# Patient Record
Sex: MALE | Race: WHITE | NOT HISPANIC OR LATINO | URBAN - METROPOLITAN AREA
[De-identification: names, ages, dates, MRNs, and addresses within clinical notes are randomized per-mention and may not be internally consistent; named-entity substitution may affect disease eponyms.]

---

## 2019-06-30 ENCOUNTER — EMERGENCY (EMERGENCY)
Facility: HOSPITAL | Age: 69
LOS: 0 days | Discharge: HOME | End: 2019-06-30
Attending: EMERGENCY MEDICINE | Admitting: EMERGENCY MEDICINE
Payer: MEDICARE

## 2019-06-30 VITALS
RESPIRATION RATE: 18 BRPM | DIASTOLIC BLOOD PRESSURE: 93 MMHG | OXYGEN SATURATION: 95 % | SYSTOLIC BLOOD PRESSURE: 158 MMHG | TEMPERATURE: 100 F | HEART RATE: 82 BPM

## 2019-06-30 VITALS
WEIGHT: 192.02 LBS | HEIGHT: 66 IN | HEART RATE: 89 BPM | SYSTOLIC BLOOD PRESSURE: 155 MMHG | TEMPERATURE: 103 F | RESPIRATION RATE: 18 BRPM | DIASTOLIC BLOOD PRESSURE: 67 MMHG | OXYGEN SATURATION: 95 %

## 2019-06-30 DIAGNOSIS — Z90.49 ACQUIRED ABSENCE OF OTHER SPECIFIED PARTS OF DIGESTIVE TRACT: Chronic | ICD-10-CM

## 2019-06-30 DIAGNOSIS — R50.9 FEVER, UNSPECIFIED: ICD-10-CM

## 2019-06-30 DIAGNOSIS — Z87.891 PERSONAL HISTORY OF NICOTINE DEPENDENCE: ICD-10-CM

## 2019-06-30 DIAGNOSIS — J02.9 ACUTE PHARYNGITIS, UNSPECIFIED: ICD-10-CM

## 2019-06-30 DIAGNOSIS — Z95.1 PRESENCE OF AORTOCORONARY BYPASS GRAFT: Chronic | ICD-10-CM

## 2019-06-30 LAB
ALBUMIN SERPL ELPH-MCNC: 5.1 G/DL — SIGNIFICANT CHANGE UP (ref 3.5–5.2)
ALP SERPL-CCNC: 104 U/L — SIGNIFICANT CHANGE UP (ref 30–115)
ALT FLD-CCNC: 45 U/L — HIGH (ref 0–41)
ANION GAP SERPL CALC-SCNC: 17 MMOL/L — HIGH (ref 7–14)
APPEARANCE UR: CLEAR — SIGNIFICANT CHANGE UP
AST SERPL-CCNC: 53 U/L — HIGH (ref 0–41)
BACTERIA # UR AUTO: ABNORMAL
BASOPHILS # BLD AUTO: 0.02 K/UL — SIGNIFICANT CHANGE UP (ref 0–0.2)
BASOPHILS NFR BLD AUTO: 0.3 % — SIGNIFICANT CHANGE UP (ref 0–1)
BILIRUB SERPL-MCNC: 0.9 MG/DL — SIGNIFICANT CHANGE UP (ref 0.2–1.2)
BILIRUB UR-MCNC: NEGATIVE — SIGNIFICANT CHANGE UP
BUN SERPL-MCNC: 22 MG/DL — HIGH (ref 10–20)
CALCIUM SERPL-MCNC: 9.5 MG/DL — SIGNIFICANT CHANGE UP (ref 8.5–10.1)
CHLORIDE SERPL-SCNC: 98 MMOL/L — SIGNIFICANT CHANGE UP (ref 98–110)
CO2 SERPL-SCNC: 25 MMOL/L — SIGNIFICANT CHANGE UP (ref 17–32)
COLOR SPEC: YELLOW — SIGNIFICANT CHANGE UP
CREAT SERPL-MCNC: 2.1 MG/DL — HIGH (ref 0.7–1.5)
DIFF PNL FLD: ABNORMAL
EOSINOPHIL # BLD AUTO: 0.01 K/UL — SIGNIFICANT CHANGE UP (ref 0–0.7)
EOSINOPHIL NFR BLD AUTO: 0.1 % — SIGNIFICANT CHANGE UP (ref 0–8)
EPI CELLS # UR: ABNORMAL /HPF
GLUCOSE SERPL-MCNC: 143 MG/DL — HIGH (ref 70–99)
GLUCOSE UR QL: NEGATIVE MG/DL — SIGNIFICANT CHANGE UP
GRAN CASTS # UR COMP ASSIST: ABNORMAL /LPF
HCT VFR BLD CALC: 38 % — LOW (ref 42–52)
HGB BLD-MCNC: 12.9 G/DL — LOW (ref 14–18)
HYALINE CASTS # UR AUTO: ABNORMAL /LPF
IMM GRANULOCYTES NFR BLD AUTO: 0.1 % — SIGNIFICANT CHANGE UP (ref 0.1–0.3)
KETONES UR-MCNC: ABNORMAL
LACTATE SERPL-SCNC: 1.4 MMOL/L — SIGNIFICANT CHANGE UP (ref 0.5–2.2)
LEUKOCYTE ESTERASE UR-ACNC: NEGATIVE — SIGNIFICANT CHANGE UP
LYMPHOCYTES # BLD AUTO: 0.65 K/UL — LOW (ref 1.2–3.4)
LYMPHOCYTES # BLD AUTO: 9.2 % — LOW (ref 20.5–51.1)
MCHC RBC-ENTMCNC: 30.1 PG — SIGNIFICANT CHANGE UP (ref 27–31)
MCHC RBC-ENTMCNC: 33.9 G/DL — SIGNIFICANT CHANGE UP (ref 32–37)
MCV RBC AUTO: 88.6 FL — SIGNIFICANT CHANGE UP (ref 80–94)
MONOCYTES # BLD AUTO: 0.88 K/UL — HIGH (ref 0.1–0.6)
MONOCYTES NFR BLD AUTO: 12.4 % — HIGH (ref 1.7–9.3)
NEUTROPHILS # BLD AUTO: 5.52 K/UL — SIGNIFICANT CHANGE UP (ref 1.4–6.5)
NEUTROPHILS NFR BLD AUTO: 77.9 % — HIGH (ref 42.2–75.2)
NITRITE UR-MCNC: NEGATIVE — SIGNIFICANT CHANGE UP
NRBC # BLD: 0 /100 WBCS — SIGNIFICANT CHANGE UP (ref 0–0)
PH UR: 6 — SIGNIFICANT CHANGE UP (ref 5–8)
PLATELET # BLD AUTO: 200 K/UL — SIGNIFICANT CHANGE UP (ref 130–400)
POTASSIUM SERPL-MCNC: 4 MMOL/L — SIGNIFICANT CHANGE UP (ref 3.5–5)
POTASSIUM SERPL-SCNC: 4 MMOL/L — SIGNIFICANT CHANGE UP (ref 3.5–5)
PROT SERPL-MCNC: 8 G/DL — SIGNIFICANT CHANGE UP (ref 6–8)
PROT UR-MCNC: >=300 MG/DL
RBC # BLD: 4.29 M/UL — LOW (ref 4.7–6.1)
RBC # FLD: 12.7 % — SIGNIFICANT CHANGE UP (ref 11.5–14.5)
RBC CASTS # UR COMP ASSIST: ABNORMAL /HPF
SODIUM SERPL-SCNC: 140 MMOL/L — SIGNIFICANT CHANGE UP (ref 135–146)
SP GR SPEC: >=1.03 (ref 1.01–1.03)
UROBILINOGEN FLD QL: 0.2 MG/DL — SIGNIFICANT CHANGE UP (ref 0.2–0.2)
WBC # BLD: 7.09 K/UL — SIGNIFICANT CHANGE UP (ref 4.8–10.8)
WBC # FLD AUTO: 7.09 K/UL — SIGNIFICANT CHANGE UP (ref 4.8–10.8)
WBC UR QL: SIGNIFICANT CHANGE UP /HPF

## 2019-06-30 PROCEDURE — 71046 X-RAY EXAM CHEST 2 VIEWS: CPT | Mod: 26

## 2019-06-30 PROCEDURE — 99284 EMERGENCY DEPT VISIT MOD MDM: CPT

## 2019-06-30 RX ORDER — SODIUM CHLORIDE 9 MG/ML
1000 INJECTION, SOLUTION INTRAVENOUS ONCE
Refills: 0 | Status: COMPLETED | OUTPATIENT
Start: 2019-06-30 | End: 2019-06-30

## 2019-06-30 RX ORDER — AMOXICILLIN 250 MG/5ML
1 SUSPENSION, RECONSTITUTED, ORAL (ML) ORAL
Qty: 21 | Refills: 0
Start: 2019-06-30 | End: 2019-07-06

## 2019-06-30 RX ORDER — ACETAMINOPHEN 500 MG
650 TABLET ORAL ONCE
Refills: 0 | Status: COMPLETED | OUTPATIENT
Start: 2019-06-30 | End: 2019-06-30

## 2019-06-30 RX ADMIN — Medication 650 MILLIGRAM(S): at 15:16

## 2019-06-30 RX ADMIN — SODIUM CHLORIDE 1000 MILLILITER(S): 9 INJECTION, SOLUTION INTRAVENOUS at 15:31

## 2019-06-30 NOTE — ED PROVIDER NOTE - CLINICAL SUMMARY MEDICAL DECISION MAKING FREE TEXT BOX
Results reviewed and d/w patient.  No prior labs available for comparison.  Pt thinks that his previous Cr was 1.6.  Pt instructed that he will need to follow up with PMD and well as renal.  Will start abx at this time, rec cont supportive care.  Pt will return to ED if any worseing symptoms or concerns.  He verbalizes understanding

## 2019-06-30 NOTE — ED PROVIDER NOTE - CARE PROVIDER_API CALL
Aakash Parry)  Internal Medicine; Nephrology  [facility] [place] [address]  Phone: [phone]  Fax: [phone]  Follow Up Time:     YOUR PMD,   Phone: (   )    -  Fax: (   )    -  Follow Up Time:     Nasir Bradshaw)  Internal Medicine; Nephrology  [address] [address]  Phone: [phone]  Fax: [phone]  Follow Up Time:

## 2019-06-30 NOTE — ED PROVIDER NOTE - PROGRESS NOTE DETAILS
discussed lab results with patient. Patient understands importance of follow up with nephrology and his PMD as outpatient. Return precautions given. Patient agreeable to discharge.

## 2019-06-30 NOTE — ED PROVIDER NOTE - NS ED ROS FT
Constitutional: (+) fever (+) malaise (-) diaphoresis (-) chills  Eyes: (-) visual changes   ENMT: (+) sore throat (+) hoarseness (-) nasal or chest congestion (-) runny nose (-) ear pain  (-) neck pain (-) neck stiffness  Cardiac: (-) chest pain  Respiratory: (-) cough (-) hemoptysis (-) SOB   GI: (-) nausea (-) vomiting (-) diarrhea   : (-) dysuria (-) increased frequency  (-) hematuria (-) incontinence  MS: (-) back pain   Neuro: (-) headache (-) dizziness (-) numbness/tingling to extremities B/L (-) weakness   Skin: (-) rash   Except as documented in the HPI, all other systems are negative.

## 2019-06-30 NOTE — ED PROVIDER NOTE - PHYSICAL EXAMINATION
GENERAL: Well-nourished, Well-developed. NAD.  Eyes: PERRLA, EOMI. Pink conjunctiva B/L. No asymmetry. No nystagmus. No conjunctival injection. Non-icteric sclera.  ENMT: MMM. Mild pharyngeal erythema with no exudates. Uvula midline. No oral lesions. TMs clear with good cone of light B/L. Nares patent. No tongue fasciculations.  Neck: Supple. No LAD. No cervical midline TTP. FROM  CVS: Normal S1,S2. No murmurs appreciated on auscultation   RESP: No use of accessory muscles. Chest rise symmetrical with good expansion. Lungs clear to auscultation B/L. No wheezing, rales, or rhonchi auscultated.  GI: Normal auscultation of bowel sounds in all 4 quadrants. Soft, Nontender, Nondistended.   MSK: No visible signs of trauma such as ecchymosis, erythema, or swelling. FROM of upper and lower extremities B/L.   Skin: Warm, Dry. No rashes or lesions.   EXT: Radial pulses present B/L.   Neuro: AA&O x 3. CNs II-XII grossly intact. Speaking in full sentences. No slurring of speech. No facial droop. No tremors. Sensation grossly intact. Strength 5/5 B/L. Gait within normal limits.   Psych:  Cooperative. Calm

## 2019-06-30 NOTE — ED PROVIDER NOTE - ATTENDING CONTRIBUTION TO CARE
67 yo M PMHx liver and colon cancer with h/o liver resection presents with c/o sore throat and fever x 4 days.  Pt states temp has been low grade.  + cough. no CP, no SOB.  no n/v, no rash.  Pt with  recent sick contacts.  Took 2 Tylenol this am.  On exam pt in NAD AAo x 3, non toxic, OP with erythema, + vesicle left palate, no exudates, uvula without swelling and is midline, no lad, TM with cerumen bilateral, Lungs CTA B/L, abd is soft nt nd, surgical scars noted, no rash,. no edema, good ROM, no calf tenderness 69 yo M PMHx liver and colon cancer with h/o liver resection presents with c/o sore throat and fever x 4 days.  Pt states temp has been low grade.  + cough. no CP, no SOB.  no n/v, no rash.  Pt with  recent sick contacts.  Took 2 Tylenol this am.  On exam pt in NAD AAo x 3, non toxic, OP with erythema, + vesicle left palate, no exudates, uvula without swelling and is midline, no lad, TM with cerumen bilateral, Lungs CTA B/L, + murmur, abd is soft nt nd, surgical scars noted, no rash,. no edema, good ROM, no calf tenderness

## 2019-06-30 NOTE — ED PROVIDER NOTE - NSFOLLOWUPINSTRUCTIONS_ED_ALL_ED_FT
Take medication as directed.  Follow up with your PMD and nephrologist as outpatient.    Pharyngitis    Pharyngitis is inflammation of your pharynx, which is typically caused by a viral or bacterial infection. Pharyngitis can be contagious and may spread from person to person through intimate contact, coughing, sneezing, or sharing personal items and utensils. Symptoms of pharyngitis may include sore throat, fever, headache, or swollen lymph nodes. If you are prescribed antibiotics, make sure you finish them even if you start to feel better. Gargle with salt water as often as every 1-2 hours to soothe your throat. Throat lozenges (if you are not at risk for choking) or sprays may be used to soothe your throat.    SEEK IMMEDIATE MEDICAL CARE IF YOU HAVE ANY OF THE FOLLOWING SYMPTOMS: neck stiffness, drooling, hoarseness or change in voice, inability to swallow liquids, vomiting, or trouble breathing.

## 2019-06-30 NOTE — ED PROVIDER NOTE - OBJECTIVE STATEMENT
69 yo male with Liver cancer (in remission March 2019 s/p chemo), CABG X3, Rectal bleeding (required transfusion) presents to the ED c/o sore throat and fever of 101.0 F at home x 4 days. Patient toook two Tylenol this morning.  Patient admits to recent sick contacts. 69 yo male with Liver cancer (in remission March 2019 s/p chemo and resection), CABG X3, Rectal bleeding (required transfusion) presents to the ED c/o sore throat and fever of 101.0 F at home x 4 days. Patient toook two Tylenol this morning.  Patient admits to recent sick contacts. Patient denies chest pain, SOB, cough, rash, SOB, ear pain, N/V/D, or neck pain.

## 2019-06-30 NOTE — ED PROVIDER NOTE - PROVIDER TOKENS
PROVIDER:[TOKEN:[[id number]]],FREE:[LAST:[YOUR PMD],PHONE:[(   )    -],FAX:[(   )    -]],PROVIDER:[TOKEN:[[id number]]]

## 2019-07-01 LAB
CULTURE RESULTS: SIGNIFICANT CHANGE UP
SPECIMEN SOURCE: SIGNIFICANT CHANGE UP

## 2021-09-14 ENCOUNTER — INPATIENT (INPATIENT)
Facility: HOSPITAL | Age: 71
LOS: 2 days | Discharge: HOME | End: 2021-09-17
Payer: MEDICARE

## 2021-09-14 VITALS
RESPIRATION RATE: 18 BRPM | SYSTOLIC BLOOD PRESSURE: 181 MMHG | TEMPERATURE: 101 F | HEART RATE: 93 BPM | WEIGHT: 190.04 LBS | DIASTOLIC BLOOD PRESSURE: 100 MMHG | HEIGHT: 66 IN | OXYGEN SATURATION: 96 %

## 2021-09-14 DIAGNOSIS — Z90.49 ACQUIRED ABSENCE OF OTHER SPECIFIED PARTS OF DIGESTIVE TRACT: Chronic | ICD-10-CM

## 2021-09-14 DIAGNOSIS — Z95.1 PRESENCE OF AORTOCORONARY BYPASS GRAFT: Chronic | ICD-10-CM

## 2021-09-14 DIAGNOSIS — J18.9 PNEUMONIA, UNSPECIFIED ORGANISM: ICD-10-CM

## 2021-09-14 LAB
ALBUMIN SERPL ELPH-MCNC: 4.2 G/DL — SIGNIFICANT CHANGE UP (ref 3.5–5.2)
ALP SERPL-CCNC: 119 U/L — HIGH (ref 30–115)
ALT FLD-CCNC: 22 U/L — SIGNIFICANT CHANGE UP (ref 0–41)
ANION GAP SERPL CALC-SCNC: 17 MMOL/L — HIGH (ref 7–14)
AST SERPL-CCNC: 19 U/L — SIGNIFICANT CHANGE UP (ref 0–41)
BASOPHILS # BLD AUTO: 0.02 K/UL — SIGNIFICANT CHANGE UP (ref 0–0.2)
BASOPHILS NFR BLD AUTO: 0.2 % — SIGNIFICANT CHANGE UP (ref 0–1)
BILIRUB SERPL-MCNC: 1.3 MG/DL — HIGH (ref 0.2–1.2)
BUN SERPL-MCNC: 26 MG/DL — HIGH (ref 10–20)
CALCIUM SERPL-MCNC: 8.9 MG/DL — SIGNIFICANT CHANGE UP (ref 8.5–10.1)
CHLORIDE SERPL-SCNC: 103 MMOL/L — SIGNIFICANT CHANGE UP (ref 98–110)
CO2 SERPL-SCNC: 19 MMOL/L — SIGNIFICANT CHANGE UP (ref 17–32)
CREAT SERPL-MCNC: 2.6 MG/DL — HIGH (ref 0.7–1.5)
EOSINOPHIL # BLD AUTO: 0.02 K/UL — SIGNIFICANT CHANGE UP (ref 0–0.7)
EOSINOPHIL NFR BLD AUTO: 0.2 % — SIGNIFICANT CHANGE UP (ref 0–8)
GLUCOSE BLDC GLUCOMTR-MCNC: 70 MG/DL — SIGNIFICANT CHANGE UP (ref 70–99)
GLUCOSE SERPL-MCNC: 152 MG/DL — HIGH (ref 70–99)
HCT VFR BLD CALC: 31.3 % — LOW (ref 42–52)
HGB BLD-MCNC: 10.5 G/DL — LOW (ref 14–18)
IMM GRANULOCYTES NFR BLD AUTO: 0.5 % — HIGH (ref 0.1–0.3)
LYMPHOCYTES # BLD AUTO: 0.53 K/UL — LOW (ref 1.2–3.4)
LYMPHOCYTES # BLD AUTO: 4 % — LOW (ref 20.5–51.1)
MAGNESIUM SERPL-MCNC: 1.7 MG/DL — LOW (ref 1.8–2.4)
MCHC RBC-ENTMCNC: 29.5 PG — SIGNIFICANT CHANGE UP (ref 27–31)
MCHC RBC-ENTMCNC: 33.5 G/DL — SIGNIFICANT CHANGE UP (ref 32–37)
MCV RBC AUTO: 87.9 FL — SIGNIFICANT CHANGE UP (ref 80–94)
MONOCYTES # BLD AUTO: 1.05 K/UL — HIGH (ref 0.1–0.6)
MONOCYTES NFR BLD AUTO: 8 % — SIGNIFICANT CHANGE UP (ref 1.7–9.3)
NEUTROPHILS # BLD AUTO: 11.43 K/UL — HIGH (ref 1.4–6.5)
NEUTROPHILS NFR BLD AUTO: 87.1 % — HIGH (ref 42.2–75.2)
NRBC # BLD: 0 /100 WBCS — SIGNIFICANT CHANGE UP (ref 0–0)
NT-PROBNP SERPL-SCNC: HIGH PG/ML (ref 0–300)
PLATELET # BLD AUTO: 214 K/UL — SIGNIFICANT CHANGE UP (ref 130–400)
POTASSIUM SERPL-MCNC: 3.8 MMOL/L — SIGNIFICANT CHANGE UP (ref 3.5–5)
POTASSIUM SERPL-SCNC: 3.8 MMOL/L — SIGNIFICANT CHANGE UP (ref 3.5–5)
PROT SERPL-MCNC: 6.6 G/DL — SIGNIFICANT CHANGE UP (ref 6–8)
RBC # BLD: 3.56 M/UL — LOW (ref 4.7–6.1)
RBC # FLD: 13.2 % — SIGNIFICANT CHANGE UP (ref 11.5–14.5)
SARS-COV-2 RNA SPEC QL NAA+PROBE: SIGNIFICANT CHANGE UP
SODIUM SERPL-SCNC: 139 MMOL/L — SIGNIFICANT CHANGE UP (ref 135–146)
TROPONIN T SERPL-MCNC: 0.08 NG/ML — CRITICAL HIGH
WBC # BLD: 13.11 K/UL — HIGH (ref 4.8–10.8)
WBC # FLD AUTO: 13.11 K/UL — HIGH (ref 4.8–10.8)

## 2021-09-14 PROCEDURE — 93010 ELECTROCARDIOGRAM REPORT: CPT | Mod: 76

## 2021-09-14 PROCEDURE — 71046 X-RAY EXAM CHEST 2 VIEWS: CPT | Mod: 26

## 2021-09-14 PROCEDURE — 99285 EMERGENCY DEPT VISIT HI MDM: CPT

## 2021-09-14 RX ORDER — VALSARTAN 80 MG/1
160 TABLET ORAL DAILY
Refills: 0 | Status: DISCONTINUED | OUTPATIENT
Start: 2021-09-14 | End: 2021-09-17

## 2021-09-14 RX ORDER — ESCITALOPRAM OXALATE 10 MG/1
10 TABLET, FILM COATED ORAL DAILY
Refills: 0 | Status: DISCONTINUED | OUTPATIENT
Start: 2021-09-14 | End: 2021-09-17

## 2021-09-14 RX ORDER — ACETAMINOPHEN 500 MG
650 TABLET ORAL EVERY 6 HOURS
Refills: 0 | Status: DISCONTINUED | OUTPATIENT
Start: 2021-09-14 | End: 2021-09-17

## 2021-09-14 RX ORDER — MAGNESIUM SULFATE 500 MG/ML
2 VIAL (ML) INJECTION ONCE
Refills: 0 | Status: COMPLETED | OUTPATIENT
Start: 2021-09-14 | End: 2021-09-14

## 2021-09-14 RX ORDER — AZITHROMYCIN 500 MG/1
500 TABLET, FILM COATED ORAL ONCE
Refills: 0 | Status: COMPLETED | OUTPATIENT
Start: 2021-09-14 | End: 2021-09-14

## 2021-09-14 RX ORDER — AZITHROMYCIN 500 MG/1
500 TABLET, FILM COATED ORAL EVERY 24 HOURS
Refills: 0 | Status: DISCONTINUED | OUTPATIENT
Start: 2021-09-14 | End: 2021-09-17

## 2021-09-14 RX ORDER — MAGNESIUM SULFATE 500 MG/ML
1 VIAL (ML) INJECTION ONCE
Refills: 0 | Status: DISCONTINUED | OUTPATIENT
Start: 2021-09-14 | End: 2021-09-14

## 2021-09-14 RX ORDER — ASPIRIN/CALCIUM CARB/MAGNESIUM 324 MG
81 TABLET ORAL DAILY
Refills: 0 | Status: DISCONTINUED | OUTPATIENT
Start: 2021-09-14 | End: 2021-09-17

## 2021-09-14 RX ORDER — GLUCAGON INJECTION, SOLUTION 0.5 MG/.1ML
1 INJECTION, SOLUTION SUBCUTANEOUS ONCE
Refills: 0 | Status: DISCONTINUED | OUTPATIENT
Start: 2021-09-14 | End: 2021-09-17

## 2021-09-14 RX ORDER — LANOLIN ALCOHOL/MO/W.PET/CERES
3 CREAM (GRAM) TOPICAL AT BEDTIME
Refills: 0 | Status: DISCONTINUED | OUTPATIENT
Start: 2021-09-14 | End: 2021-09-17

## 2021-09-14 RX ORDER — PREGABALIN 225 MG/1
1000 CAPSULE ORAL DAILY
Refills: 0 | Status: DISCONTINUED | OUTPATIENT
Start: 2021-09-14 | End: 2021-09-17

## 2021-09-14 RX ORDER — PANTOPRAZOLE SODIUM 20 MG/1
40 TABLET, DELAYED RELEASE ORAL
Refills: 0 | Status: DISCONTINUED | OUTPATIENT
Start: 2021-09-14 | End: 2021-09-17

## 2021-09-14 RX ORDER — ACETAMINOPHEN 500 MG
975 TABLET ORAL ONCE
Refills: 0 | Status: COMPLETED | OUTPATIENT
Start: 2021-09-14 | End: 2021-09-14

## 2021-09-14 RX ORDER — HYDRALAZINE HCL 50 MG
10 TABLET ORAL ONCE
Refills: 0 | Status: COMPLETED | OUTPATIENT
Start: 2021-09-14 | End: 2021-09-14

## 2021-09-14 RX ORDER — CHOLECALCIFEROL (VITAMIN D3) 125 MCG
2000 CAPSULE ORAL DAILY
Refills: 0 | Status: DISCONTINUED | OUTPATIENT
Start: 2021-09-14 | End: 2021-09-17

## 2021-09-14 RX ORDER — DEXTROSE 50 % IN WATER 50 %
25 SYRINGE (ML) INTRAVENOUS ONCE
Refills: 0 | Status: DISCONTINUED | OUTPATIENT
Start: 2021-09-14 | End: 2021-09-17

## 2021-09-14 RX ORDER — TAMSULOSIN HYDROCHLORIDE 0.4 MG/1
0.4 CAPSULE ORAL AT BEDTIME
Refills: 0 | Status: DISCONTINUED | OUTPATIENT
Start: 2021-09-14 | End: 2021-09-17

## 2021-09-14 RX ORDER — INSULIN LISPRO 100/ML
VIAL (ML) SUBCUTANEOUS
Refills: 0 | Status: DISCONTINUED | OUTPATIENT
Start: 2021-09-14 | End: 2021-09-17

## 2021-09-14 RX ORDER — SODIUM CHLORIDE 9 MG/ML
1000 INJECTION, SOLUTION INTRAVENOUS
Refills: 0 | Status: DISCONTINUED | OUTPATIENT
Start: 2021-09-14 | End: 2021-09-17

## 2021-09-14 RX ORDER — DEXTROSE 50 % IN WATER 50 %
12.5 SYRINGE (ML) INTRAVENOUS ONCE
Refills: 0 | Status: DISCONTINUED | OUTPATIENT
Start: 2021-09-14 | End: 2021-09-17

## 2021-09-14 RX ORDER — DEXTROSE 50 % IN WATER 50 %
15 SYRINGE (ML) INTRAVENOUS ONCE
Refills: 0 | Status: DISCONTINUED | OUTPATIENT
Start: 2021-09-14 | End: 2021-09-17

## 2021-09-14 RX ORDER — ATORVASTATIN CALCIUM 80 MG/1
80 TABLET, FILM COATED ORAL AT BEDTIME
Refills: 0 | Status: DISCONTINUED | OUTPATIENT
Start: 2021-09-14 | End: 2021-09-17

## 2021-09-14 RX ORDER — CEFEPIME 1 G/1
1000 INJECTION, POWDER, FOR SOLUTION INTRAMUSCULAR; INTRAVENOUS ONCE
Refills: 0 | Status: DISCONTINUED | OUTPATIENT
Start: 2021-09-14 | End: 2021-09-14

## 2021-09-14 RX ORDER — METOPROLOL TARTRATE 50 MG
50 TABLET ORAL EVERY 12 HOURS
Refills: 0 | Status: DISCONTINUED | OUTPATIENT
Start: 2021-09-14 | End: 2021-09-17

## 2021-09-14 RX ORDER — ONDANSETRON 8 MG/1
4 TABLET, FILM COATED ORAL EVERY 8 HOURS
Refills: 0 | Status: DISCONTINUED | OUTPATIENT
Start: 2021-09-14 | End: 2021-09-17

## 2021-09-14 RX ORDER — CEFEPIME 1 G/1
2000 INJECTION, POWDER, FOR SOLUTION INTRAMUSCULAR; INTRAVENOUS DAILY
Refills: 0 | Status: DISCONTINUED | OUTPATIENT
Start: 2021-09-14 | End: 2021-09-17

## 2021-09-14 RX ORDER — CEFTRIAXONE 500 MG/1
1000 INJECTION, POWDER, FOR SOLUTION INTRAMUSCULAR; INTRAVENOUS ONCE
Refills: 0 | Status: COMPLETED | OUTPATIENT
Start: 2021-09-14 | End: 2021-09-14

## 2021-09-14 RX ORDER — INFLUENZA VIRUS VACCINE 15; 15; 15; 15 UG/.5ML; UG/.5ML; UG/.5ML; UG/.5ML
0.5 SUSPENSION INTRAMUSCULAR ONCE
Refills: 0 | Status: DISCONTINUED | OUTPATIENT
Start: 2021-09-14 | End: 2021-09-17

## 2021-09-14 RX ADMIN — SODIUM CHLORIDE 75 MILLILITER(S): 9 INJECTION, SOLUTION INTRAVENOUS at 23:35

## 2021-09-14 RX ADMIN — AZITHROMYCIN 255 MILLIGRAM(S): 500 TABLET, FILM COATED ORAL at 19:41

## 2021-09-14 RX ADMIN — Medication 10 MILLIGRAM(S): at 23:34

## 2021-09-14 RX ADMIN — CEFTRIAXONE 100 MILLIGRAM(S): 500 INJECTION, POWDER, FOR SOLUTION INTRAMUSCULAR; INTRAVENOUS at 19:41

## 2021-09-14 RX ADMIN — Medication 50 GRAM(S): at 18:39

## 2021-09-14 RX ADMIN — Medication 975 MILLIGRAM(S): at 17:22

## 2021-09-14 NOTE — ED ADULT NURSE NOTE - CAS TRG GENERAL AIRWAY, MLM
Patent Nepro with Carb Steady x 1 daily (425kcal, 19g protein)/consistent carbohydrate (no snacks)/renal replacement pts:no protein restr,no conc K & phos, low sodium/supplement (specify) Nepro with Carb Steady x 1 daily (425kcal, 19g protein)/supplement (specify)/consistent carbohydrate renal with no snacks

## 2021-09-14 NOTE — H&P ADULT - PROBLEM SELECTOR PLAN 1
abx  ivf  tylenol for pyrexia    # DM  - FS with SS    # HTN  - c/w meds    #BPH  - c/w meds Community Acquired Pna  abx  ivf  tylenol for pyrexia    # DM  - FS with SS    # HTN  - c/w meds    #BPH  - c/w meds    Critical AS - will need TAVR

## 2021-09-14 NOTE — ED PROVIDER NOTE - OBJECTIVE STATEMENT
Pt is a 70M with a pmhx of CAD s/p 3-vessel CABG, HTN, HLD, DM2, and cardiac valvular disease presenting with SOB and cough. PT states the SOB began 2wks ago, has been steadily worsening and pt developed cough several days ago. He additionally reports intermittent retrosternal band-like chest pain on a few occasions over the past 2wks similar to cardiac pain he has had previously. Otherwise pt feels well, no body aches, no current chest pain, no leg swelling/pain. Pt states he saw Dr. Grewal in office today who diagnosed him with pneumonia Pt is a 70M with a pmhx of CAD s/p 3-vessel CABG, HTN, HLD, DM2, and cardiac valvular disease presenting with SOB and cough. PT states the SOB began 2wks ago, has been steadily worsening and pt developed cough several days ago. He additionally reports intermittent retrosternal band-like chest pain on a few occasions over the past 2wks similar to cardiac pain he has had previously. Otherwise pt feels well, no body aches, no current chest pain, no leg swelling/pain. Pt states he saw Dr. Grewal in office today who diagnosed him with pneumonia after pulse ox was 85% on RA and pt had crackles on lung exam.

## 2021-09-14 NOTE — ED ADULT NURSE NOTE - NSICDXPASTSURGICALHX_GEN_ALL_CORE_FT
PAST SURGICAL HISTORY:  History of appendectomy     History of coronary artery bypass graft     History of resection of liver

## 2021-09-14 NOTE — H&P ADULT - HISTORY OF PRESENT ILLNESS
70M with a pmhx of CAD s/p 3-vessel CABG, HTN, HLD, DM2, and cardiac valvular disease presenting with SOB and cough. PT states the SOB began 2wks ago, has been steadily worsening and pt developed cough several days ago. He additionally reports intermittent retrosternal band-like chest pain on a few occasions over the past 2wks similar to cardiac pain he has had previously. Otherwise pt feels well, no body aches, no current chest pain, no leg swelling/pain. Pt states he saw Dr. Grewal in office today who diagnosed him with pneumonia 70M with a pmhx of CAD s/p 3-vessel CABG, HTN, HLD, DM2, and cardiac valvular disease presenting with SOB and cough. PT states the SOB began 2wks ago, has been steadily worsening and pt developed cough several days ago. He additionally reports intermittent retrosternal band-like chest pain on a few occasions over the past 2wks similar to cardiac pain he has had previously. Otherwise pt feels well, no body aches, no current chest pain, no leg swelling/pain. Pt states he saw Dr. Grewal in office today who diagnosed him with pneumonia.  Confirmed above hx with pt.

## 2021-09-14 NOTE — H&P ADULT - ASSESSMENT
70M with a pmhx of CAD s/p 3-vessel CABG, HTN, HLD, DM2, and cardiac valvular disease presenting with SOB and cough. PT states the SOB began 2wks ago, has been steadily worsening and pt developed cough several days ago. He additionally reports intermittent retrosternal band-like chest pain on a few occasions over the past 2wks similar to cardiac pain he has had previously. Otherwise pt feels well, no body aches, no current chest pain, no leg swelling/pain. Pt states he saw Dr. Grewal in office today who diagnosed him with pneumonia

## 2021-09-14 NOTE — ED PROVIDER NOTE - PHYSICAL EXAMINATION
CONSTITUTIONAL:  NAD  SKIN:  warm, dry  HEAD:  NCAT  EYES:  NL inspection  ENT:  MMM  NECK:  supple; non tender  CARD:  RRR, moderate holosystolic ejection murmur auscultated maximally over R sternal border 2nd intercostal space  RESP:  + crackles L lung base, no wheezing, symmetric chest rise, O2 sat on 2L NC 95%  ABD:  S/NT, no R/G  MSK:  no pedal edema, no calf TTP/erythema  NEURO:  grossly unremarkable  PSYCH:  cooperative, appropriate

## 2021-09-14 NOTE — ED PROVIDER NOTE - NS ED ROS FT
CONSTITUTIONAL:  see HPI  SKIN:  no skin rash  EYES:  no visual changes  ENMT: no neck pain or stiffness  CARD:  no chest pain  RESP:  + cough and exertional SOB. No respiratory distress  ABD:  no abdominal pain, nausea, vomiting, or diarrhea  :  no dysuria, frequency or burning  MSK:  no back pain  NEURO:  no headache   Except as documented in the HPI,  all other systems are negative

## 2021-09-14 NOTE — ED PROVIDER NOTE - ATTENDING CONTRIBUTION TO CARE
70y male above PMH inc critical AS pending surgery with worsening of chronic exertional dyspnea now with fever, no cp, no leg pain, at PMD O2 sat 85%, in ED vital signs appreciated, nontoxic but winded with conversation, head nc/at, perrla, conj pink mmm neck supple cor rrr with III/VI amandeep lugns with crackles left base abd snt calves nontender no c/c/e neuro nofnocal, labs and studies reviewed and d/w Dr Grewal, will admit for IV abx, will place on lr tele for serial trop though likely due to renal insuff, also BNP elevated, likely chronic, regardless would not diurese in setting of infection

## 2021-09-15 LAB
A1C WITH ESTIMATED AVERAGE GLUCOSE RESULT: 6.3 % — HIGH (ref 4–5.6)
ALBUMIN SERPL ELPH-MCNC: 4 G/DL — SIGNIFICANT CHANGE UP (ref 3.5–5.2)
ALP SERPL-CCNC: 100 U/L — SIGNIFICANT CHANGE UP (ref 30–115)
ALT FLD-CCNC: 19 U/L — SIGNIFICANT CHANGE UP (ref 0–41)
ANION GAP SERPL CALC-SCNC: 15 MMOL/L — HIGH (ref 7–14)
AST SERPL-CCNC: 20 U/L — SIGNIFICANT CHANGE UP (ref 0–41)
BILIRUB SERPL-MCNC: 0.9 MG/DL — SIGNIFICANT CHANGE UP (ref 0.2–1.2)
BUN SERPL-MCNC: 26 MG/DL — HIGH (ref 10–20)
CALCIUM SERPL-MCNC: 8.8 MG/DL — SIGNIFICANT CHANGE UP (ref 8.5–10.1)
CHLORIDE SERPL-SCNC: 104 MMOL/L — SIGNIFICANT CHANGE UP (ref 98–110)
CO2 SERPL-SCNC: 18 MMOL/L — SIGNIFICANT CHANGE UP (ref 17–32)
CREAT SERPL-MCNC: 2.6 MG/DL — HIGH (ref 0.7–1.5)
ESTIMATED AVERAGE GLUCOSE: 134 MG/DL — HIGH (ref 68–114)
GLUCOSE BLDC GLUCOMTR-MCNC: 105 MG/DL — HIGH (ref 70–99)
GLUCOSE BLDC GLUCOMTR-MCNC: 122 MG/DL — HIGH (ref 70–99)
GLUCOSE BLDC GLUCOMTR-MCNC: 50 MG/DL — CRITICAL LOW (ref 70–99)
GLUCOSE BLDC GLUCOMTR-MCNC: 59 MG/DL — LOW (ref 70–99)
GLUCOSE BLDC GLUCOMTR-MCNC: 64 MG/DL — LOW (ref 70–99)
GLUCOSE BLDC GLUCOMTR-MCNC: 64 MG/DL — LOW (ref 70–99)
GLUCOSE BLDC GLUCOMTR-MCNC: 65 MG/DL — LOW (ref 70–99)
GLUCOSE BLDC GLUCOMTR-MCNC: 95 MG/DL — SIGNIFICANT CHANGE UP (ref 70–99)
GLUCOSE BLDC GLUCOMTR-MCNC: 99 MG/DL — SIGNIFICANT CHANGE UP (ref 70–99)
GLUCOSE SERPL-MCNC: 46 MG/DL — CRITICAL LOW (ref 70–99)
HCT VFR BLD CALC: 29.6 % — LOW (ref 42–52)
HCV AB S/CO SERPL IA: 124.2 COI — HIGH
HCV AB SERPL-IMP: REACTIVE
HGB BLD-MCNC: 9.7 G/DL — LOW (ref 14–18)
MCHC RBC-ENTMCNC: 29 PG — SIGNIFICANT CHANGE UP (ref 27–31)
MCHC RBC-ENTMCNC: 32.8 G/DL — SIGNIFICANT CHANGE UP (ref 32–37)
MCV RBC AUTO: 88.6 FL — SIGNIFICANT CHANGE UP (ref 80–94)
NRBC # BLD: 0 /100 WBCS — SIGNIFICANT CHANGE UP (ref 0–0)
PLATELET # BLD AUTO: 203 K/UL — SIGNIFICANT CHANGE UP (ref 130–400)
POTASSIUM SERPL-MCNC: 3.4 MMOL/L — LOW (ref 3.5–5)
POTASSIUM SERPL-SCNC: 3.4 MMOL/L — LOW (ref 3.5–5)
PROT SERPL-MCNC: 6.2 G/DL — SIGNIFICANT CHANGE UP (ref 6–8)
RBC # BLD: 3.34 M/UL — LOW (ref 4.7–6.1)
RBC # FLD: 13.3 % — SIGNIFICANT CHANGE UP (ref 11.5–14.5)
SODIUM SERPL-SCNC: 137 MMOL/L — SIGNIFICANT CHANGE UP (ref 135–146)
TROPONIN T SERPL-MCNC: 0.07 NG/ML — CRITICAL HIGH
WBC # BLD: 13.14 K/UL — HIGH (ref 4.8–10.8)
WBC # FLD AUTO: 13.14 K/UL — HIGH (ref 4.8–10.8)

## 2021-09-15 RX ORDER — HYDRALAZINE HCL 50 MG
10 TABLET ORAL ONCE
Refills: 0 | Status: COMPLETED | OUTPATIENT
Start: 2021-09-15 | End: 2021-09-15

## 2021-09-15 RX ADMIN — ATORVASTATIN CALCIUM 80 MILLIGRAM(S): 80 TABLET, FILM COATED ORAL at 23:06

## 2021-09-15 RX ADMIN — VALSARTAN 160 MILLIGRAM(S): 80 TABLET ORAL at 05:11

## 2021-09-15 RX ADMIN — AZITHROMYCIN 255 MILLIGRAM(S): 500 TABLET, FILM COATED ORAL at 20:42

## 2021-09-15 RX ADMIN — ESCITALOPRAM OXALATE 10 MILLIGRAM(S): 10 TABLET, FILM COATED ORAL at 11:09

## 2021-09-15 RX ADMIN — Medication 50 MILLIGRAM(S): at 05:11

## 2021-09-15 RX ADMIN — PREGABALIN 1000 MICROGRAM(S): 225 CAPSULE ORAL at 11:08

## 2021-09-15 RX ADMIN — Medication 3 MILLIGRAM(S): at 23:06

## 2021-09-15 RX ADMIN — Medication 50 MILLIGRAM(S): at 18:13

## 2021-09-15 RX ADMIN — SODIUM CHLORIDE 75 MILLILITER(S): 9 INJECTION, SOLUTION INTRAVENOUS at 10:19

## 2021-09-15 RX ADMIN — PANTOPRAZOLE SODIUM 40 MILLIGRAM(S): 20 TABLET, DELAYED RELEASE ORAL at 05:11

## 2021-09-15 RX ADMIN — Medication 10 MILLIGRAM(S): at 23:04

## 2021-09-15 RX ADMIN — Medication 2000 UNIT(S): at 11:08

## 2021-09-15 RX ADMIN — Medication 81 MILLIGRAM(S): at 11:08

## 2021-09-15 RX ADMIN — TAMSULOSIN HYDROCHLORIDE 0.4 MILLIGRAM(S): 0.4 CAPSULE ORAL at 23:06

## 2021-09-15 RX ADMIN — CEFEPIME 100 MILLIGRAM(S): 1 INJECTION, POWDER, FOR SOLUTION INTRAMUSCULAR; INTRAVENOUS at 11:07

## 2021-09-15 NOTE — CHART NOTE - NSCHARTNOTEFT_GEN_A_CORE
Called by lab regarding glucose on BMP this am 0f 46. PT ate/drank juice. Repeat . He is A&Ox3 with no complaints.

## 2021-09-16 LAB
ANION GAP SERPL CALC-SCNC: 13 MMOL/L — SIGNIFICANT CHANGE UP (ref 7–14)
BASOPHILS # BLD AUTO: 0.01 K/UL — SIGNIFICANT CHANGE UP (ref 0–0.2)
BASOPHILS NFR BLD AUTO: 0.1 % — SIGNIFICANT CHANGE UP (ref 0–1)
BUN SERPL-MCNC: 33 MG/DL — HIGH (ref 10–20)
CALCIUM SERPL-MCNC: 8.9 MG/DL — SIGNIFICANT CHANGE UP (ref 8.5–10.1)
CHLORIDE SERPL-SCNC: 104 MMOL/L — SIGNIFICANT CHANGE UP (ref 98–110)
CO2 SERPL-SCNC: 19 MMOL/L — SIGNIFICANT CHANGE UP (ref 17–32)
COVID-19 SPIKE DOMAIN AB INTERP: POSITIVE
COVID-19 SPIKE DOMAIN ANTIBODY RESULT: >250 U/ML — HIGH
CREAT SERPL-MCNC: 2.7 MG/DL — HIGH (ref 0.7–1.5)
EOSINOPHIL # BLD AUTO: 0.09 K/UL — SIGNIFICANT CHANGE UP (ref 0–0.7)
EOSINOPHIL NFR BLD AUTO: 0.7 % — SIGNIFICANT CHANGE UP (ref 0–8)
GLUCOSE BLDC GLUCOMTR-MCNC: 103 MG/DL — HIGH (ref 70–99)
GLUCOSE BLDC GLUCOMTR-MCNC: 108 MG/DL — HIGH (ref 70–99)
GLUCOSE BLDC GLUCOMTR-MCNC: 132 MG/DL — HIGH (ref 70–99)
GLUCOSE BLDC GLUCOMTR-MCNC: 139 MG/DL — HIGH (ref 70–99)
GLUCOSE BLDC GLUCOMTR-MCNC: 50 MG/DL — CRITICAL LOW (ref 70–99)
GLUCOSE BLDC GLUCOMTR-MCNC: 51 MG/DL — CRITICAL LOW (ref 70–99)
GLUCOSE BLDC GLUCOMTR-MCNC: 59 MG/DL — LOW (ref 70–99)
GLUCOSE BLDC GLUCOMTR-MCNC: 63 MG/DL — LOW (ref 70–99)
GLUCOSE BLDC GLUCOMTR-MCNC: 64 MG/DL — LOW (ref 70–99)
GLUCOSE SERPL-MCNC: 74 MG/DL — SIGNIFICANT CHANGE UP (ref 70–99)
HCT VFR BLD CALC: 28.6 % — LOW (ref 42–52)
HGB BLD-MCNC: 9.4 G/DL — LOW (ref 14–18)
IMM GRANULOCYTES NFR BLD AUTO: 0.4 % — HIGH (ref 0.1–0.3)
LYMPHOCYTES # BLD AUTO: 0.73 K/UL — LOW (ref 1.2–3.4)
LYMPHOCYTES # BLD AUTO: 6.1 % — LOW (ref 20.5–51.1)
MAGNESIUM SERPL-MCNC: 1.9 MG/DL — SIGNIFICANT CHANGE UP (ref 1.8–2.4)
MCHC RBC-ENTMCNC: 29.3 PG — SIGNIFICANT CHANGE UP (ref 27–31)
MCHC RBC-ENTMCNC: 32.9 G/DL — SIGNIFICANT CHANGE UP (ref 32–37)
MCV RBC AUTO: 89.1 FL — SIGNIFICANT CHANGE UP (ref 80–94)
MONOCYTES # BLD AUTO: 1.14 K/UL — HIGH (ref 0.1–0.6)
MONOCYTES NFR BLD AUTO: 9.5 % — HIGH (ref 1.7–9.3)
NEUTROPHILS # BLD AUTO: 10.04 K/UL — HIGH (ref 1.4–6.5)
NEUTROPHILS NFR BLD AUTO: 83.2 % — HIGH (ref 42.2–75.2)
NRBC # BLD: 0 /100 WBCS — SIGNIFICANT CHANGE UP (ref 0–0)
PLATELET # BLD AUTO: 194 K/UL — SIGNIFICANT CHANGE UP (ref 130–400)
POTASSIUM SERPL-MCNC: 4.1 MMOL/L — SIGNIFICANT CHANGE UP (ref 3.5–5)
POTASSIUM SERPL-SCNC: 4.1 MMOL/L — SIGNIFICANT CHANGE UP (ref 3.5–5)
RBC # BLD: 3.21 M/UL — LOW (ref 4.7–6.1)
RBC # FLD: 13.7 % — SIGNIFICANT CHANGE UP (ref 11.5–14.5)
SARS-COV-2 IGG+IGM SERPL QL IA: >250 U/ML — HIGH
SARS-COV-2 IGG+IGM SERPL QL IA: POSITIVE
SODIUM SERPL-SCNC: 136 MMOL/L — SIGNIFICANT CHANGE UP (ref 135–146)
WBC # BLD: 12.06 K/UL — HIGH (ref 4.8–10.8)
WBC # FLD AUTO: 12.06 K/UL — HIGH (ref 4.8–10.8)

## 2021-09-16 RX ORDER — SODIUM CHLORIDE 9 MG/ML
1000 INJECTION, SOLUTION INTRAVENOUS
Refills: 0 | Status: DISCONTINUED | OUTPATIENT
Start: 2021-09-16 | End: 2021-09-17

## 2021-09-16 RX ORDER — DEXTROSE 50 % IN WATER 50 %
25 SYRINGE (ML) INTRAVENOUS ONCE
Refills: 0 | Status: COMPLETED | OUTPATIENT
Start: 2021-09-16 | End: 2021-09-16

## 2021-09-16 RX ORDER — CLONAZEPAM 1 MG
0.5 TABLET ORAL ONCE
Refills: 0 | Status: DISCONTINUED | OUTPATIENT
Start: 2021-09-16 | End: 2021-09-16

## 2021-09-16 RX ORDER — DEXTROSE 50 % IN WATER 50 %
15 SYRINGE (ML) INTRAVENOUS ONCE
Refills: 0 | Status: COMPLETED | OUTPATIENT
Start: 2021-09-16 | End: 2021-09-16

## 2021-09-16 RX ORDER — HEPARIN SODIUM 5000 [USP'U]/ML
5000 INJECTION INTRAVENOUS; SUBCUTANEOUS EVERY 12 HOURS
Refills: 0 | Status: DISCONTINUED | OUTPATIENT
Start: 2021-09-16 | End: 2021-09-17

## 2021-09-16 RX ORDER — AMLODIPINE BESYLATE 2.5 MG/1
10 TABLET ORAL AT BEDTIME
Refills: 0 | Status: DISCONTINUED | OUTPATIENT
Start: 2021-09-16 | End: 2021-09-17

## 2021-09-16 RX ORDER — HYDRALAZINE HCL 50 MG
20 TABLET ORAL ONCE
Refills: 0 | Status: COMPLETED | OUTPATIENT
Start: 2021-09-16 | End: 2021-09-16

## 2021-09-16 RX ADMIN — Medication 15 GRAM(S): at 16:52

## 2021-09-16 RX ADMIN — ATORVASTATIN CALCIUM 80 MILLIGRAM(S): 80 TABLET, FILM COATED ORAL at 22:23

## 2021-09-16 RX ADMIN — Medication 81 MILLIGRAM(S): at 12:58

## 2021-09-16 RX ADMIN — ESCITALOPRAM OXALATE 10 MILLIGRAM(S): 10 TABLET, FILM COATED ORAL at 12:58

## 2021-09-16 RX ADMIN — Medication 50 MILLIGRAM(S): at 06:05

## 2021-09-16 RX ADMIN — TAMSULOSIN HYDROCHLORIDE 0.4 MILLIGRAM(S): 0.4 CAPSULE ORAL at 22:23

## 2021-09-16 RX ADMIN — Medication 50 MILLIGRAM(S): at 17:52

## 2021-09-16 RX ADMIN — Medication 2000 UNIT(S): at 12:58

## 2021-09-16 RX ADMIN — Medication 25 GRAM(S): at 12:15

## 2021-09-16 RX ADMIN — PREGABALIN 1000 MICROGRAM(S): 225 CAPSULE ORAL at 18:08

## 2021-09-16 RX ADMIN — SODIUM CHLORIDE 80 MILLILITER(S): 9 INJECTION, SOLUTION INTRAVENOUS at 16:53

## 2021-09-16 RX ADMIN — CEFEPIME 100 MILLIGRAM(S): 1 INJECTION, POWDER, FOR SOLUTION INTRAMUSCULAR; INTRAVENOUS at 12:59

## 2021-09-16 RX ADMIN — Medication 0.5 MILLIGRAM(S): at 23:55

## 2021-09-16 RX ADMIN — SODIUM CHLORIDE 75 MILLILITER(S): 9 INJECTION, SOLUTION INTRAVENOUS at 04:35

## 2021-09-16 RX ADMIN — AZITHROMYCIN 255 MILLIGRAM(S): 500 TABLET, FILM COATED ORAL at 22:23

## 2021-09-16 RX ADMIN — PANTOPRAZOLE SODIUM 40 MILLIGRAM(S): 20 TABLET, DELAYED RELEASE ORAL at 06:05

## 2021-09-16 RX ADMIN — Medication 20 MILLIGRAM(S): at 22:23

## 2021-09-16 RX ADMIN — VALSARTAN 160 MILLIGRAM(S): 80 TABLET ORAL at 06:05

## 2021-09-16 NOTE — PROGRESS NOTE ADULT - SUBJECTIVE AND OBJECTIVE BOX
Name: MAAME IGLESIAS  Age: 70y  Gender: Male    Pt was seen and examined.   c/o:    Allergies:  No Known Allergies      PHYSICAL EXAM:    Vitals:  T(C): 36.1 (09-16-21 @ 21:00), Max: 36.3 (09-16-21 @ 05:00)  HR: 87 (09-16-21 @ 21:00) (82 - 91)  BP: 208/119 (09-16-21 @ 23:23) (157/87 - 208/119)  RR: 16 (09-16-21 @ 21:00) (16 - 18)  SpO2: 97% (09-16-21 @ 15:13) (97% - 99%)  Wt(kg): --Vital Signs Last 24 Hrs  T(C): 36.1 (16 Sep 2021 21:00), Max: 36.3 (16 Sep 2021 05:00)  T(F): 97 (16 Sep 2021 21:00), Max: 97.4 (16 Sep 2021 05:00)  HR: 87 (16 Sep 2021 21:00) (82 - 91)  BP: 208/119 (16 Sep 2021 23:23) (157/87 - 208/119)  BP(mean): 86 (16 Sep 2021 23:23) (86 - 86)  RR: 16 (16 Sep 2021 21:00) (16 - 18)  SpO2: 97% (16 Sep 2021 15:13) (97% - 99%)      NECK: Supple, No JVD  CHEST/LUNG: CTA, B/L, No rales, rhonchi, wheezing, or rubs  HEART: S1,S2, N1 Regular rate and rhythm; No murmurs, rubs, or gallops  ABDOMEN: Soft, Nontender, Nondistended; Bowel sounds present  EXTREMITIES:  2+ Peripheral Pulses, No clubbing, cyanosis, or edema      LABS:                        9.4    12.06 )-----------( 194      ( 16 Sep 2021 19:05 )             28.6     09-16    136  |  104  |  33<H>  ----------------------------<  74  4.1   |  19  |  2.7<H>    Ca    8.9      16 Sep 2021 19:05  Mg     1.9     09-16    TPro  6.2  /  Alb  4.0  /  TBili  0.9  /  DBili  x   /  AST  20  /  ALT  19  /  AlkPhos  100  09-15    LIVER FUNCTIONS - ( 15 Sep 2021 07:02 )  Alb: 4.0 g/dL / Pro: 6.2 g/dL / ALK PHOS: 100 U/L / ALT: 19 U/L / AST: 20 U/L / GGT: x           CARDIAC MARKERS ( 15 Sep 2021 07:02 )  x     / 0.07 ng/mL / x     / x     / x            MEDICATIONS  (STANDING):  aspirin enteric coated 81 milliGRAM(s) Oral daily  atorvastatin 80 milliGRAM(s) Oral at bedtime  azithromycin  IVPB 500 milliGRAM(s) IV Intermittent every 24 hours  cefepime   IVPB 2000 milliGRAM(s) IV Intermittent daily  cholecalciferol 2000 Unit(s) Oral daily  cyanocobalamin 1000 MICROGram(s) Oral daily  dextrose 40% Gel 15 Gram(s) Oral once  dextrose 5% + sodium chloride 0.45%. 1000 milliLiter(s) (80 mL/Hr) IV Continuous <Continuous>  dextrose 5%. 1000 milliLiter(s) (50 mL/Hr) IV Continuous <Continuous>  dextrose 5%. 1000 milliLiter(s) (100 mL/Hr) IV Continuous <Continuous>  dextrose 50% Injectable 25 Gram(s) IV Push once  dextrose 50% Injectable 12.5 Gram(s) IV Push once  dextrose 50% Injectable 25 Gram(s) IV Push once  dextrose 50% Injectable 25 Gram(s) IV Push once  escitalopram 10 milliGRAM(s) Oral daily  glucagon  Injectable 1 milliGRAM(s) IntraMuscular once  influenza   Vaccine 0.5 milliLiter(s) IntraMuscular once  insulin lispro (ADMELOG) corrective regimen sliding scale   SubCutaneous three times a day before meals  metoprolol tartrate 50 milliGRAM(s) Oral every 12 hours  pantoprazole    Tablet 40 milliGRAM(s) Oral before breakfast  sodium chloride 0.45%. 1000 milliLiter(s) (75 mL/Hr) IV Continuous <Continuous>  tamsulosin 0.4 milliGRAM(s) Oral at bedtime  valsartan 160 milliGRAM(s) Oral daily        RADIOLOGY & ADDITIONAL TESTS:    Imaging Personally Reviewed:  [ ] YES  [ ] NO    A/P:  ·  Problem: Pneumonia.   ·  Plan: Community Acquired Pna  abx  ivf, O2  tylenol for pyrexia    # DM  - FS with SS    # HTN  - c/w meds, uncontrolled mostly 2/2 severe anxiety.  wll add Amlodpine and klonopin qhs.    #BPH  - c/w meds    Critical AS - will need TAVR.

## 2021-09-16 NOTE — CHART NOTE - NSCHARTNOTEFT_GEN_A_CORE
Patient told RN he doesn't want to leave anymore due to his quality of life. Would consider psych consult if he continues to express this view

## 2021-09-16 NOTE — PROGRESS NOTE ADULT - SUBJECTIVE AND OBJECTIVE BOX
Name: MAAME IGLESIAS  Age: 70y  Gender: Male    Pt was seen and examined.   c/o:  doing better he says  less dyspneic today    Allergies:  No Known Allergies      PHYSICAL EXAM:    Vitals:  T(C): 36 (09-15-21 @ 21:00), Max: 36.3 (09-15-21 @ 14:30)  HR: 83 (09-15-21 @ 23:58) (83 - 92)  BP: 157/87 (09-15-21 @ 23:58) (134/66 - 170/97)  RR: 18 (09-15-21 @ 23:58) (18 - 18)  SpO2: 98% (09-15-21 @ 08:18) (98% - 98%)  Wt(kg): --Vital Signs Last 24 Hrs  T(C): 36 (15 Sep 2021 21:00), Max: 36.3 (15 Sep 2021 14:30)  T(F): 96.8 (15 Sep 2021 21:00), Max: 97.4 (15 Sep 2021 14:30)  HR: 83 (15 Sep 2021 23:58) (83 - 92)  BP: 157/87 (15 Sep 2021 23:58) (134/66 - 170/97)  BP(mean): --  RR: 18 (15 Sep 2021 23:58) (18 - 18)  SpO2: 98% (15 Sep 2021 08:18) (98% - 98%)      NECK: Supple, No JVD  CHEST/LUNG: left base crackles  HEART: S1,S2, N1 Regular rate and rhythm; No murmurs, rubs, or gallops  ABDOMEN: Soft, Nontender, Nondistended; Bowel sounds present  EXTREMITIES:  2+ Peripheral Pulses, No clubbing, cyanosis, or edema      LABS:                        9.7    13.14 )-----------( 203      ( 15 Sep 2021 07:02 )             29.6     09-15    137  |  104  |  26<H>  ----------------------------<  46<LL>  3.4<L>   |  18  |  2.6<H>    Ca    8.8      15 Sep 2021 07:02  Mg     1.7     09-14    TPro  6.2  /  Alb  4.0  /  TBili  0.9  /  DBili  x   /  AST  20  /  ALT  19  /  AlkPhos  100  09-15    LIVER FUNCTIONS - ( 15 Sep 2021 07:02 )  Alb: 4.0 g/dL / Pro: 6.2 g/dL / ALK PHOS: 100 U/L / ALT: 19 U/L / AST: 20 U/L / GGT: x           CARDIAC MARKERS ( 15 Sep 2021 07:02 )  x     / 0.07 ng/mL / x     / x     / x      CARDIAC MARKERS ( 14 Sep 2021 17:17 )  x     / 0.08 ng/mL / x     / x     / x            MEDICATIONS  (STANDING):  aspirin enteric coated 81 milliGRAM(s) Oral daily  atorvastatin 80 milliGRAM(s) Oral at bedtime  azithromycin  IVPB 500 milliGRAM(s) IV Intermittent every 24 hours  cefepime   IVPB 2000 milliGRAM(s) IV Intermittent daily  cholecalciferol 2000 Unit(s) Oral daily  cyanocobalamin 1000 MICROGram(s) Oral daily  dextrose 40% Gel 15 Gram(s) Oral once  dextrose 5%. 1000 milliLiter(s) (50 mL/Hr) IV Continuous <Continuous>  dextrose 5%. 1000 milliLiter(s) (100 mL/Hr) IV Continuous <Continuous>  dextrose 50% Injectable 25 Gram(s) IV Push once  dextrose 50% Injectable 12.5 Gram(s) IV Push once  dextrose 50% Injectable 25 Gram(s) IV Push once  escitalopram 10 milliGRAM(s) Oral daily  glucagon  Injectable 1 milliGRAM(s) IntraMuscular once  influenza   Vaccine 0.5 milliLiter(s) IntraMuscular once  insulin lispro (ADMELOG) corrective regimen sliding scale   SubCutaneous three times a day before meals  metoprolol tartrate 50 milliGRAM(s) Oral every 12 hours  pantoprazole    Tablet 40 milliGRAM(s) Oral before breakfast  sodium chloride 0.45%. 1000 milliLiter(s) (75 mL/Hr) IV Continuous <Continuous>  tamsulosin 0.4 milliGRAM(s) Oral at bedtime  valsartan 160 milliGRAM(s) Oral daily        RADIOLOGY & ADDITIONAL TESTS:    Imaging Personally Reviewed:  [ ] YES  [ ] NO    A/P:  70M with a pmhx of CAD s/p 3-vessel CABG, HTN, HLD, DM2, and cardiac valvular disease presenting with SOB and cough. PT states the SOB began 2wks ago, has been steadily worsening and pt developed cough several days ago. He additionally reports intermittent retrosternal band-like chest pain on a few occasions over the past 2wks similar to cardiac pain he has had previously. Otherwise pt feels well, no body aches, no current chest pain, no leg swelling/pain. Pt states he saw Dr. Grewal in office today who diagnosed him with pneumonia     Problem/Plan - 1:  ·  Problem: Pneumonia.   ·  Plan: Community Acquired Pna  abx  ivf, O2  tylenol for pyrexia    # DM  - FS with SS    # HTN  - c/w meds    #BPH  - c/w meds    Critical AS - will need TAVR.

## 2021-09-17 VITALS — OXYGEN SATURATION: 96 %

## 2021-09-17 LAB
ANION GAP SERPL CALC-SCNC: 15 MMOL/L — HIGH (ref 7–14)
BASOPHILS # BLD AUTO: 0.02 K/UL — SIGNIFICANT CHANGE UP (ref 0–0.2)
BASOPHILS NFR BLD AUTO: 0.1 % — SIGNIFICANT CHANGE UP (ref 0–1)
BUN SERPL-MCNC: 36 MG/DL — HIGH (ref 10–20)
CALCIUM SERPL-MCNC: 8.8 MG/DL — SIGNIFICANT CHANGE UP (ref 8.5–10.1)
CHLORIDE SERPL-SCNC: 106 MMOL/L — SIGNIFICANT CHANGE UP (ref 98–110)
CO2 SERPL-SCNC: 17 MMOL/L — SIGNIFICANT CHANGE UP (ref 17–32)
CREAT SERPL-MCNC: 2.5 MG/DL — HIGH (ref 0.7–1.5)
EOSINOPHIL # BLD AUTO: 0.07 K/UL — SIGNIFICANT CHANGE UP (ref 0–0.7)
EOSINOPHIL NFR BLD AUTO: 0.5 % — SIGNIFICANT CHANGE UP (ref 0–8)
GLUCOSE BLDC GLUCOMTR-MCNC: 112 MG/DL — HIGH (ref 70–99)
GLUCOSE BLDC GLUCOMTR-MCNC: 174 MG/DL — HIGH (ref 70–99)
GLUCOSE BLDC GLUCOMTR-MCNC: 199 MG/DL — HIGH (ref 70–99)
GLUCOSE BLDC GLUCOMTR-MCNC: 427 MG/DL — HIGH (ref 70–99)
GLUCOSE BLDC GLUCOMTR-MCNC: 46 MG/DL — CRITICAL LOW (ref 70–99)
GLUCOSE SERPL-MCNC: 26 MG/DL — CRITICAL LOW (ref 70–99)
HCT VFR BLD CALC: 29.4 % — LOW (ref 42–52)
HGB BLD-MCNC: 9.5 G/DL — LOW (ref 14–18)
IMM GRANULOCYTES NFR BLD AUTO: 0.3 % — SIGNIFICANT CHANGE UP (ref 0.1–0.3)
LYMPHOCYTES # BLD AUTO: 1.08 K/UL — LOW (ref 1.2–3.4)
LYMPHOCYTES # BLD AUTO: 7.6 % — LOW (ref 20.5–51.1)
MAGNESIUM SERPL-MCNC: 2 MG/DL — SIGNIFICANT CHANGE UP (ref 1.8–2.4)
MCHC RBC-ENTMCNC: 29.3 PG — SIGNIFICANT CHANGE UP (ref 27–31)
MCHC RBC-ENTMCNC: 32.3 G/DL — SIGNIFICANT CHANGE UP (ref 32–37)
MCV RBC AUTO: 90.7 FL — SIGNIFICANT CHANGE UP (ref 80–94)
MONOCYTES # BLD AUTO: 2.03 K/UL — HIGH (ref 0.1–0.6)
MONOCYTES NFR BLD AUTO: 14.2 % — HIGH (ref 1.7–9.3)
NEUTROPHILS # BLD AUTO: 11.06 K/UL — HIGH (ref 1.4–6.5)
NEUTROPHILS NFR BLD AUTO: 77.3 % — HIGH (ref 42.2–75.2)
NRBC # BLD: 0 /100 WBCS — SIGNIFICANT CHANGE UP (ref 0–0)
PLATELET # BLD AUTO: 239 K/UL — SIGNIFICANT CHANGE UP (ref 130–400)
POTASSIUM SERPL-MCNC: 3.8 MMOL/L — SIGNIFICANT CHANGE UP (ref 3.5–5)
POTASSIUM SERPL-SCNC: 3.8 MMOL/L — SIGNIFICANT CHANGE UP (ref 3.5–5)
RBC # BLD: 3.24 M/UL — LOW (ref 4.7–6.1)
RBC # FLD: 13.9 % — SIGNIFICANT CHANGE UP (ref 11.5–14.5)
SODIUM SERPL-SCNC: 138 MMOL/L — SIGNIFICANT CHANGE UP (ref 135–146)
WBC # BLD: 13.87 K/UL — HIGH (ref 4.8–10.8)
WBC # FLD AUTO: 13.87 K/UL — HIGH (ref 4.8–10.8)

## 2021-09-17 RX ORDER — TAMSULOSIN HYDROCHLORIDE 0.4 MG/1
0 CAPSULE ORAL
Qty: 0 | Refills: 0 | DISCHARGE

## 2021-09-17 RX ORDER — VALSARTAN 80 MG/1
1 TABLET ORAL
Qty: 0 | Refills: 0 | DISCHARGE
Start: 2021-09-17

## 2021-09-17 RX ORDER — METFORMIN HYDROCHLORIDE 850 MG/1
2 TABLET ORAL
Qty: 0 | Refills: 0 | DISCHARGE

## 2021-09-17 RX ORDER — METOPROLOL TARTRATE 50 MG
0 TABLET ORAL
Qty: 0 | Refills: 0 | DISCHARGE

## 2021-09-17 RX ORDER — VALSARTAN 80 MG/1
1 TABLET ORAL
Qty: 0 | Refills: 0 | DISCHARGE

## 2021-09-17 RX ORDER — ROSUVASTATIN CALCIUM 5 MG/1
0 TABLET ORAL
Qty: 0 | Refills: 0 | DISCHARGE

## 2021-09-17 RX ORDER — CEFUROXIME AXETIL 250 MG
1 TABLET ORAL
Qty: 14 | Refills: 0
Start: 2021-09-17 | End: 2021-09-23

## 2021-09-17 RX ORDER — DEXTROSE 50 % IN WATER 50 %
15 SYRINGE (ML) INTRAVENOUS ONCE
Refills: 0 | Status: DISCONTINUED | OUTPATIENT
Start: 2021-09-17 | End: 2021-09-17

## 2021-09-17 RX ORDER — VALSARTAN 80 MG/1
0 TABLET ORAL
Qty: 0 | Refills: 0 | DISCHARGE

## 2021-09-17 RX ADMIN — PANTOPRAZOLE SODIUM 40 MILLIGRAM(S): 20 TABLET, DELAYED RELEASE ORAL at 05:45

## 2021-09-17 RX ADMIN — Medication 50 MILLIGRAM(S): at 05:45

## 2021-09-17 RX ADMIN — VALSARTAN 160 MILLIGRAM(S): 80 TABLET ORAL at 05:45

## 2021-09-17 RX ADMIN — Medication 81 MILLIGRAM(S): at 11:12

## 2021-09-17 RX ADMIN — CEFEPIME 100 MILLIGRAM(S): 1 INJECTION, POWDER, FOR SOLUTION INTRAMUSCULAR; INTRAVENOUS at 11:13

## 2021-09-17 RX ADMIN — PREGABALIN 1000 MICROGRAM(S): 225 CAPSULE ORAL at 11:12

## 2021-09-17 RX ADMIN — ESCITALOPRAM OXALATE 10 MILLIGRAM(S): 10 TABLET, FILM COATED ORAL at 11:12

## 2021-09-17 RX ADMIN — Medication 2000 UNIT(S): at 11:12

## 2021-09-17 NOTE — DISCHARGE NOTE PROVIDER - NSDCCPCAREPLAN_GEN_ALL_CORE_FT
PRINCIPAL DISCHARGE DIAGNOSIS  Diagnosis: Pneumonia  Assessment and Plan of Treatment: Rx: cefuroxime 500 mg 1 tablet by mouth daily for 7 days sent to pharmacy      SECONDARY DISCHARGE DIAGNOSES  Diagnosis: Diabetes mellitus  Assessment and Plan of Treatment: please hold diabetes medication until you follow up with Dr. Grewal    Diagnosis: Aortic stenosis  Assessment and Plan of Treatment: please follow up with Dr. Grewal to arrange for procedure

## 2021-09-17 NOTE — DISCHARGE NOTE PROVIDER - HOSPITAL COURSE
70M with a pmhx of CAD s/p 3-vessel CABG, HTN, HLD, DM2, and cardiac valvular disease presenting with SOB and cough. PT states the SOB began 2wks ago, has been steadily worsening and pt developed cough several days ago. He additionally reports intermittent retrosternal band-like chest pain on a few occasions over the past 2wks similar to cardiac pain he has had previously. Otherwise pt feels well, no body aches, no current chest pain, no leg swelling/pain. Pt states he saw Dr. rGewal in office today who diagnosed him with pneumonia.    medically stable for dc with outpatient follow up. Rx: cefuroxime 500 mg PO bid x 7 days, amlodipine 10 mg Po qd.  Follow up with Dr. Grewal in 1 week.    Critical AS - will need TAVR. Instructed to FU with Dr. Grewal  low FS glucose while inpatient due to poor PO intake. Hold metformin and glimepiride upon dc and FU outpatient.

## 2021-09-17 NOTE — DISCHARGE NOTE PROVIDER - NSDCMRMEDTOKEN_GEN_ALL_CORE_FT
amLODIPine 10 mg oral tablet: 1 tab(s) orally once a day  aspirin 81 mg oral tablet:   bumetanide 2 mg oral tablet: 1 tab(s) orally once a day  cefuroxime 500 mg oral tablet: 1 tab(s) orally 2 times a day   cyanocobalamin:   Diovan 160 mg oral tablet: 1 tab(s) orally once a day  escitalopram 10 mg oral tablet: 1 tab(s) orally once a day  Metoprolol Tartrate 50 mg oral tablet: 1 tab(s) orally 2 times a day  pantoprazole 40 mg oral delayed release tablet: 1 tab(s) orally once a day  rosuvastatin 20 mg oral tablet: 1 tab(s) orally once a day  tamsulosin 0.4 mg oral capsule: 1 cap(s) orally once a day  Vitamin D3 25 mcg (1000 intl units) oral tablet: 1 tab(s) orally once a day

## 2021-09-17 NOTE — DISCHARGE NOTE PROVIDER - CARE PROVIDER_API CALL
Peter Grewal [facility]  [address] [place] [address]  Phone: [phone]  Fax: [phone]  Follow Up Time: 1 week

## 2021-09-17 NOTE — PROVIDER CONTACT NOTE (OTHER) - ACTION/TREATMENT ORDERED:
per Chidi solorio fluids ot d5 1/2 ns at 80 give pt dose of 15 glugagon po and re-check f/s 5:15-5:30PM.
dextrose gel

## 2021-09-17 NOTE — PROVIDER CONTACT NOTE (OTHER) - SITUATION
pt's f/s 59 and on 1/2 ns at 75cc/hr, pt had been 51 f/s for lunch and given D50 and 139 f/s after. pt has poor po appetite.
FS 46

## 2021-09-17 NOTE — DISCHARGE NOTE NURSING/CASE MANAGEMENT/SOCIAL WORK - PATIENT PORTAL LINK FT
You can access the FollowMyHealth Patient Portal offered by [facility] by registering at the following website: [email or web address]. By joining [facility]’s FollowMyHealth portal, you will also be able to view your health information using other applications (apps) compatible with our system.

## 2021-09-18 LAB — HCV RNA FLD QL NAA+PROBE: SIGNIFICANT CHANGE UP

## 2021-09-22 DIAGNOSIS — R06.02 SHORTNESS OF BREATH: ICD-10-CM

## 2021-09-22 DIAGNOSIS — E11.9 TYPE 2 DIABETES MELLITUS WITHOUT COMPLICATIONS: ICD-10-CM

## 2021-09-22 DIAGNOSIS — N40.0 BENIGN PROSTATIC HYPERPLASIA WITHOUT LOWER URINARY TRACT SYMPTOMS: ICD-10-CM

## 2021-09-22 DIAGNOSIS — E78.5 HYPERLIPIDEMIA, UNSPECIFIED: ICD-10-CM

## 2021-09-22 DIAGNOSIS — Z85.05 PERSONAL HISTORY OF MALIGNANT NEOPLASM OF LIVER: ICD-10-CM

## 2021-09-22 DIAGNOSIS — I25.10 ATHEROSCLEROTIC HEART DISEASE OF NATIVE CORONARY ARTERY WITHOUT ANGINA PECTORIS: ICD-10-CM

## 2021-09-22 DIAGNOSIS — I10 ESSENTIAL (PRIMARY) HYPERTENSION: ICD-10-CM

## 2021-09-22 DIAGNOSIS — J18.9 PNEUMONIA, UNSPECIFIED ORGANISM: ICD-10-CM

## 2021-09-22 DIAGNOSIS — I35.0 NONRHEUMATIC AORTIC (VALVE) STENOSIS: ICD-10-CM

## 2021-09-22 DIAGNOSIS — Z79.82 LONG TERM (CURRENT) USE OF ASPIRIN: ICD-10-CM

## 2021-11-01 ENCOUNTER — INPATIENT (INPATIENT)
Facility: HOSPITAL | Age: 71
LOS: 3 days | Discharge: HOME | End: 2021-11-05
Payer: MEDICARE

## 2021-11-01 VITALS
DIASTOLIC BLOOD PRESSURE: 81 MMHG | WEIGHT: 190.04 LBS | SYSTOLIC BLOOD PRESSURE: 163 MMHG | RESPIRATION RATE: 25 BRPM | HEART RATE: 76 BPM | HEIGHT: 66 IN | TEMPERATURE: 98 F | OXYGEN SATURATION: 97 %

## 2021-11-01 DIAGNOSIS — Z90.49 ACQUIRED ABSENCE OF OTHER SPECIFIED PARTS OF DIGESTIVE TRACT: Chronic | ICD-10-CM

## 2021-11-01 DIAGNOSIS — Z95.1 PRESENCE OF AORTOCORONARY BYPASS GRAFT: Chronic | ICD-10-CM

## 2021-11-01 LAB
ALBUMIN SERPL ELPH-MCNC: 4 G/DL — SIGNIFICANT CHANGE UP (ref 3.5–5.2)
ALP SERPL-CCNC: 106 U/L — SIGNIFICANT CHANGE UP (ref 30–115)
ALT FLD-CCNC: 170 U/L — HIGH (ref 0–41)
ANION GAP SERPL CALC-SCNC: 19 MMOL/L — HIGH (ref 7–14)
AST SERPL-CCNC: 124 U/L — HIGH (ref 0–41)
BASE EXCESS BLDV CALC-SCNC: -3.7 MMOL/L — LOW (ref -2–3)
BASOPHILS # BLD AUTO: 0.04 K/UL — SIGNIFICANT CHANGE UP (ref 0–0.2)
BASOPHILS NFR BLD AUTO: 0.4 % — SIGNIFICANT CHANGE UP (ref 0–1)
BILIRUB SERPL-MCNC: 0.7 MG/DL — SIGNIFICANT CHANGE UP (ref 0.2–1.2)
BUN SERPL-MCNC: 44 MG/DL — HIGH (ref 10–20)
CALCIUM SERPL-MCNC: 9 MG/DL — SIGNIFICANT CHANGE UP (ref 8.5–10.1)
CHLORIDE SERPL-SCNC: 99 MMOL/L — SIGNIFICANT CHANGE UP (ref 98–110)
CO2 SERPL-SCNC: 19 MMOL/L — SIGNIFICANT CHANGE UP (ref 17–32)
CREAT SERPL-MCNC: 3.1 MG/DL — HIGH (ref 0.7–1.5)
EOSINOPHIL # BLD AUTO: 0.21 K/UL — SIGNIFICANT CHANGE UP (ref 0–0.7)
EOSINOPHIL NFR BLD AUTO: 2.1 % — SIGNIFICANT CHANGE UP (ref 0–8)
GLUCOSE BLDC GLUCOMTR-MCNC: 115 MG/DL — HIGH (ref 70–99)
GLUCOSE SERPL-MCNC: 133 MG/DL — HIGH (ref 70–99)
HCO3 BLDV-SCNC: 22 MMOL/L — SIGNIFICANT CHANGE UP (ref 22–29)
HCT VFR BLD CALC: 30.5 % — LOW (ref 42–52)
HGB BLD-MCNC: 9.6 G/DL — LOW (ref 14–18)
IMM GRANULOCYTES NFR BLD AUTO: 0.3 % — SIGNIFICANT CHANGE UP (ref 0.1–0.3)
LACTATE BLDV-MCNC: 1.2 MMOL/L — SIGNIFICANT CHANGE UP (ref 0.5–2)
LIDOCAIN IGE QN: 33 U/L — SIGNIFICANT CHANGE UP (ref 7–60)
LYMPHOCYTES # BLD AUTO: 0.95 K/UL — LOW (ref 1.2–3.4)
LYMPHOCYTES # BLD AUTO: 9.5 % — LOW (ref 20.5–51.1)
MAGNESIUM SERPL-MCNC: 1.6 MG/DL — LOW (ref 1.8–2.4)
MCHC RBC-ENTMCNC: 27 PG — SIGNIFICANT CHANGE UP (ref 27–31)
MCHC RBC-ENTMCNC: 31.5 G/DL — LOW (ref 32–37)
MCV RBC AUTO: 85.7 FL — SIGNIFICANT CHANGE UP (ref 80–94)
MONOCYTES # BLD AUTO: 1.16 K/UL — HIGH (ref 0.1–0.6)
MONOCYTES NFR BLD AUTO: 11.6 % — HIGH (ref 1.7–9.3)
NEUTROPHILS # BLD AUTO: 7.65 K/UL — HIGH (ref 1.4–6.5)
NEUTROPHILS NFR BLD AUTO: 76.1 % — HIGH (ref 42.2–75.2)
NRBC # BLD: 0 /100 WBCS — SIGNIFICANT CHANGE UP (ref 0–0)
NT-PROBNP SERPL-SCNC: HIGH PG/ML (ref 0–300)
PCO2 BLDV: 42 MMHG — SIGNIFICANT CHANGE UP (ref 42–55)
PH BLDV: 7.33 — SIGNIFICANT CHANGE UP (ref 7.32–7.43)
PLATELET # BLD AUTO: 223 K/UL — SIGNIFICANT CHANGE UP (ref 130–400)
PO2 BLDV: 26 MMHG — SIGNIFICANT CHANGE UP
POTASSIUM SERPL-MCNC: 3.8 MMOL/L — SIGNIFICANT CHANGE UP (ref 3.5–5)
POTASSIUM SERPL-SCNC: 3.8 MMOL/L — SIGNIFICANT CHANGE UP (ref 3.5–5)
PROT SERPL-MCNC: 6.6 G/DL — SIGNIFICANT CHANGE UP (ref 6–8)
RBC # BLD: 3.56 M/UL — LOW (ref 4.7–6.1)
RBC # FLD: 15.5 % — HIGH (ref 11.5–14.5)
SAO2 % BLDV: 26.7 % — SIGNIFICANT CHANGE UP
SARS-COV-2 RNA SPEC QL NAA+PROBE: SIGNIFICANT CHANGE UP
SODIUM SERPL-SCNC: 137 MMOL/L — SIGNIFICANT CHANGE UP (ref 135–146)
TROPONIN T SERPL-MCNC: 0.06 NG/ML — CRITICAL HIGH
WBC # BLD: 10.04 K/UL — SIGNIFICANT CHANGE UP (ref 4.8–10.8)
WBC # FLD AUTO: 10.04 K/UL — SIGNIFICANT CHANGE UP (ref 4.8–10.8)

## 2021-11-01 PROCEDURE — 93010 ELECTROCARDIOGRAM REPORT: CPT

## 2021-11-01 PROCEDURE — 76705 ECHO EXAM OF ABDOMEN: CPT | Mod: 26

## 2021-11-01 PROCEDURE — 71046 X-RAY EXAM CHEST 2 VIEWS: CPT | Mod: 26

## 2021-11-01 PROCEDURE — 99285 EMERGENCY DEPT VISIT HI MDM: CPT

## 2021-11-01 RX ORDER — MAGNESIUM SULFATE 500 MG/ML
2 VIAL (ML) INJECTION ONCE
Refills: 0 | Status: COMPLETED | OUTPATIENT
Start: 2021-11-01 | End: 2021-11-01

## 2021-11-01 RX ORDER — FUROSEMIDE 40 MG
40 TABLET ORAL EVERY 12 HOURS
Refills: 0 | Status: DISCONTINUED | OUTPATIENT
Start: 2021-11-02 | End: 2021-11-03

## 2021-11-01 RX ORDER — HEPARIN SODIUM 5000 [USP'U]/ML
5000 INJECTION INTRAVENOUS; SUBCUTANEOUS EVERY 8 HOURS
Refills: 0 | Status: DISCONTINUED | OUTPATIENT
Start: 2021-11-01 | End: 2021-11-05

## 2021-11-01 RX ORDER — DEXTROSE 50 % IN WATER 50 %
25 SYRINGE (ML) INTRAVENOUS ONCE
Refills: 0 | Status: DISCONTINUED | OUTPATIENT
Start: 2021-11-01 | End: 2021-11-05

## 2021-11-01 RX ORDER — GLUCAGON INJECTION, SOLUTION 0.5 MG/.1ML
1 INJECTION, SOLUTION SUBCUTANEOUS ONCE
Refills: 0 | Status: DISCONTINUED | OUTPATIENT
Start: 2021-11-01 | End: 2021-11-05

## 2021-11-01 RX ORDER — ESCITALOPRAM OXALATE 10 MG/1
10 TABLET, FILM COATED ORAL DAILY
Refills: 0 | Status: DISCONTINUED | OUTPATIENT
Start: 2021-11-01 | End: 2021-11-01

## 2021-11-01 RX ORDER — DEXTROSE 50 % IN WATER 50 %
15 SYRINGE (ML) INTRAVENOUS ONCE
Refills: 0 | Status: DISCONTINUED | OUTPATIENT
Start: 2021-11-01 | End: 2021-11-05

## 2021-11-01 RX ORDER — DEXTROSE 50 % IN WATER 50 %
12.5 SYRINGE (ML) INTRAVENOUS ONCE
Refills: 0 | Status: DISCONTINUED | OUTPATIENT
Start: 2021-11-01 | End: 2021-11-05

## 2021-11-01 RX ORDER — INSULIN LISPRO 100/ML
VIAL (ML) SUBCUTANEOUS
Refills: 0 | Status: DISCONTINUED | OUTPATIENT
Start: 2021-11-01 | End: 2021-11-05

## 2021-11-01 RX ORDER — PANTOPRAZOLE SODIUM 20 MG/1
40 TABLET, DELAYED RELEASE ORAL
Refills: 0 | Status: DISCONTINUED | OUTPATIENT
Start: 2021-11-01 | End: 2021-11-05

## 2021-11-01 RX ORDER — VALSARTAN 80 MG/1
320 TABLET ORAL DAILY
Refills: 0 | Status: DISCONTINUED | OUTPATIENT
Start: 2021-11-01 | End: 2021-11-01

## 2021-11-01 RX ORDER — METOPROLOL TARTRATE 50 MG
50 TABLET ORAL
Refills: 0 | Status: DISCONTINUED | OUTPATIENT
Start: 2021-11-01 | End: 2021-11-05

## 2021-11-01 RX ORDER — FUROSEMIDE 40 MG
60 TABLET ORAL ONCE
Refills: 0 | Status: COMPLETED | OUTPATIENT
Start: 2021-11-01 | End: 2021-11-01

## 2021-11-01 RX ORDER — CHLORHEXIDINE GLUCONATE 213 G/1000ML
1 SOLUTION TOPICAL DAILY
Refills: 0 | Status: DISCONTINUED | OUTPATIENT
Start: 2021-11-01 | End: 2021-11-05

## 2021-11-01 RX ORDER — SODIUM CHLORIDE 9 MG/ML
1000 INJECTION, SOLUTION INTRAVENOUS
Refills: 0 | Status: DISCONTINUED | OUTPATIENT
Start: 2021-11-01 | End: 2021-11-05

## 2021-11-01 RX ORDER — CHOLECALCIFEROL (VITAMIN D3) 125 MCG
1000 CAPSULE ORAL DAILY
Refills: 0 | Status: DISCONTINUED | OUTPATIENT
Start: 2021-11-01 | End: 2021-11-05

## 2021-11-01 RX ORDER — VALSARTAN 80 MG/1
320 TABLET ORAL DAILY
Refills: 0 | Status: DISCONTINUED | OUTPATIENT
Start: 2021-11-01 | End: 2021-11-05

## 2021-11-01 RX ORDER — ASPIRIN/CALCIUM CARB/MAGNESIUM 324 MG
81 TABLET ORAL DAILY
Refills: 0 | Status: DISCONTINUED | OUTPATIENT
Start: 2021-11-01 | End: 2021-11-05

## 2021-11-01 RX ORDER — ATORVASTATIN CALCIUM 80 MG/1
80 TABLET, FILM COATED ORAL AT BEDTIME
Refills: 0 | Status: DISCONTINUED | OUTPATIENT
Start: 2021-11-01 | End: 2021-11-05

## 2021-11-01 RX ORDER — AMLODIPINE BESYLATE 2.5 MG/1
1 TABLET ORAL
Qty: 0 | Refills: 0 | DISCHARGE

## 2021-11-01 RX ORDER — ASPIRIN/CALCIUM CARB/MAGNESIUM 324 MG
81 TABLET ORAL DAILY
Refills: 0 | Status: DISCONTINUED | OUTPATIENT
Start: 2021-11-01 | End: 2021-11-01

## 2021-11-01 RX ORDER — TAMSULOSIN HYDROCHLORIDE 0.4 MG/1
0.4 CAPSULE ORAL AT BEDTIME
Refills: 0 | Status: DISCONTINUED | OUTPATIENT
Start: 2021-11-01 | End: 2021-11-05

## 2021-11-01 RX ORDER — VALSARTAN 80 MG/1
160 TABLET ORAL DAILY
Refills: 0 | Status: DISCONTINUED | OUTPATIENT
Start: 2021-11-01 | End: 2021-11-01

## 2021-11-01 RX ORDER — LANOLIN ALCOHOL/MO/W.PET/CERES
3 CREAM (GRAM) TOPICAL AT BEDTIME
Refills: 0 | Status: DISCONTINUED | OUTPATIENT
Start: 2021-11-01 | End: 2021-11-05

## 2021-11-01 RX ADMIN — TAMSULOSIN HYDROCHLORIDE 0.4 MILLIGRAM(S): 0.4 CAPSULE ORAL at 21:35

## 2021-11-01 RX ADMIN — HEPARIN SODIUM 5000 UNIT(S): 5000 INJECTION INTRAVENOUS; SUBCUTANEOUS at 16:12

## 2021-11-01 RX ADMIN — Medication 25 GRAM(S): at 16:12

## 2021-11-01 RX ADMIN — Medication 60 MILLIGRAM(S): at 16:12

## 2021-11-01 RX ADMIN — ATORVASTATIN CALCIUM 80 MILLIGRAM(S): 80 TABLET, FILM COATED ORAL at 21:35

## 2021-11-01 RX ADMIN — Medication 25 GRAM(S): at 17:47

## 2021-11-01 RX ADMIN — Medication 50 MILLIGRAM(S): at 17:47

## 2021-11-01 NOTE — H&P ADULT - ASSESSMENT
71 yo male, with PMH of CAD s/p CABG, HTN, DM II , DL, colon cancer sp colectomy with lung metastasis and liver metastasis s/p resection, severe AS, CKD IV, and BPH presented for SOB    # Shortness of breath   # Pulmonary edema  # Critical AS    - patient complaining of SOB since 2 weeks with dry cough , orthopnea, and PND, worsening leg swelling  - sating 97% on room air  - on PE : bilateral crackles, JVD + , pitting edema b/l, systolic murmur radiating to carotids  - CXR Enlarged cardiac silhouette with patchy bilateral perihilar opacities and small bilateral pleural effusions.     Findings may represent . CHF/pulmonary edema  - pulmonary edema likely from AS  - follow up TTE; or get TTE reports from [facility]  - accurate in out; avoid volume depletion ; keep I < O  - lasix 60 mg iv stat in ED; cw lasix 40 mg q 12; asses volume status daily and monitor BP   - Keep SaO2 > 92%  - HOB elevated ; avoid aspiration  - Plan for TAVR when medically optimized   - monitor on TELE  - Keep K > 4 and Mg > 2  - Fluid restriction  - hold home bumetanide    # CAD s/p CABG  # Hypertension  # dyslipidemia    - cw ASA 81 mg daily  - on rosuvastatin 20 mg daily; start atorvastatin 80 mg inpatient  - cw metoprolol 50 mg q 12  - cw Diovan 320 mg daily  - Follow up Lipid panel  - Follow up TSH  - DASH TLC diet    # Troponinemia    - likely from renal failure and demand ischemia  - monitor trop  - no new EKG changes    # Transaminitis    - RUQ US Postoperative change, status post cholecystectomy.  - h/o liver metastasis and Hep C  - likely from congested liver    # prolonged QTc  # First degree AV block    - patient on metoprolol 50 q 12  - avoid QT prolonging agents; hold home escitalopram     # Colon Cancer with liver and lung metastasis    - s/p colectomy  - s/p liver resection  - Serial CT chest outpatient    # H/o Hepatitis C    - treated with INF  - follow up HepC level     # BPH    - cw Tamsulosin 0.4 mg daily    # CKD IV  # HAGMA    - follow up serum and urine Osm  - follow up urine lytes  - renally adjusted medication  - avoid nephrotoxins  - worsening renal function likely pre renal  - check phosphorus level     # pre- DM II    - A1c 6.3  - Not on any medication  - CC diet  - keep FS < 180 start bolus insulin if persistently high  - avoid hypoglycemia    # Normocytic anemia    - likely chronic, HD stable, no evidence of bleed  - follow up iron studies and retic count  - follow up folate and B12    # DVT ppx Heparin  # GI ppx protonix  # Diet DASH TLC CC  # full Code

## 2021-11-01 NOTE — ED ADULT TRIAGE NOTE - CHIEF COMPLAINT QUOTE
SOB for a few weeks. Recent PNA, hx of colon ca with mets to lung. Pt reports MD needs to call Dr. Grewal.

## 2021-11-01 NOTE — H&P ADULT - HISTORY OF PRESENT ILLNESS
71 yo male, with PMH of CAD s/p CABG, HTN, DM II , DL, colon cancer sp colectomy with lung metastasis and liver metastasis s/p resection, severe AS, CKD IV, and BPH presented for SOB  Patient has critical AS , delayed TAVR due to recent pneumonia and decline kidney function  History goes back to two weeks ago, when patient started to feel short of breath on exertion with worsening symptoms, dry cough, orthopnea and PND. Patient also reports worsening leg swelling    in /81 76 bpm 97 F

## 2021-11-01 NOTE — H&P ADULT - NSHPSOCIALHISTORY_GEN_ALL_CORE
former smoker stopped 40 years ago smoked for 30 years 1 pack and half per day  social alcohol consumer  lives at home with wife independent  worked as

## 2021-11-01 NOTE — ED PROVIDER NOTE - ATTENDING CONTRIBUTION TO CARE
71 yo male, with PMH of CAD s/p CABG, HTN, NIDDM, colon cancer s/p colectomy with lung metastasis and liver metastasis s/p resection, severe AS, CKD IV, and BPH presented for SOB  Patient has critical AS and has had delayed TAVR due to recent pneumonia and decline in kidney function.  Patient with two weeks of shortness of breath on exertion, dry cough, orthopnea and PND. Patient also reports worsening leg swelling.  PE:  agree with above.  A/P:  AS/HERNANDEZ.  Labs, Imaging and Reassess.  CT Surgery Consult.

## 2021-11-01 NOTE — ED PROVIDER NOTE - OBJECTIVE STATEMENT
70 y m, pmh of  cad, dm, hld, chf, pw sob. SOB started a week ago, no all/agg factors, not associated with chest pain. Dr. Grewal endorsed that he is to be admitted to his service for upcoming tavern. Denies f/c, cp, sob, n/v/d, abd pain dysuria

## 2021-11-01 NOTE — H&P ADULT - NSHPLABSRESULTS_GEN_ALL_CORE
9.6    10.04 )-----------( 223      ( 01 Nov 2021 11:20 )             30.5       11-01    137  |  99  |  44<H>  ----------------------------<  133<H>  3.8   |  19  |  3.1<H>    Ca    9.0      01 Nov 2021 11:20  Mg     1.6     11-01    TPro  6.6  /  Alb  4.0  /  TBili  0.7  /  DBili  x   /  AST  124<H>  /  ALT  170<H>  /  AlkPhos  106  11-01    < from: Xray Chest 2 Views PA/Lat (11.01.21 @ 12:22) >    Enlarged cardiac silhouette with patchy bilateral perihilar opacities and small bilateral pleural effusions. Findings may represent CHF/pulmonary edema.      < from: US Abdomen Upper Quadrant Right (11.01.21 @ 14:54) >    Postoperative change, status post cholecystectomy.    Right-sided pleural effusion.    < end of copied text >    < from: US Abdomen Upper Quadrant Right (11.01.21 @ 14:54) >    Postoperative change, status post cholecystectomy.    Right-sided pleural effusion.

## 2021-11-01 NOTE — H&P ADULT - NSICDXPASTMEDICALHX_GEN_ALL_CORE_FT
PAST MEDICAL HISTORY:  3-vessel CAD sp CABG    Colon cancer liver and lung metastasis    DMII (diabetes mellitus, type 2)     Dyslipidemia     Hepatitis C treated INF    Hypertension

## 2021-11-01 NOTE — ED ADULT NURSE REASSESSMENT NOTE - NS ED NURSE REASSESS COMMENT FT1
Pt received aaox3, lying in bed on cardiac monitor. Respirations even and unlabored. Pt speaks in clear, full sentences. Pt is ambulatory without assistance with steady gait. Wife at the bedside. IV access noted, 18G to LAC. IV access patent and access site is unremarkable. IVPB magnesium infusing as ordered for replenishment.  Pt here for telemetry admission for acute CHF. Pt offering no complaints at this time. Will continue to monitor and treat per orders.

## 2021-11-01 NOTE — H&P ADULT - NSICDXPASTSURGICALHX_GEN_ALL_CORE_FT
PAST SURGICAL HISTORY:  H/O colectomy     History of appendectomy     History of coronary artery bypass graft     History of resection of liver     S/P cholecystectomy

## 2021-11-01 NOTE — H&P ADULT - ATTENDING COMMENTS
case d/w resident in detail.  Pt in acute chf xacerbation 2/2 critical AV stenosis.  will need coronary angio and TAVR and likely ICD.

## 2021-11-02 LAB
ALBUMIN SERPL ELPH-MCNC: 4.3 G/DL — SIGNIFICANT CHANGE UP (ref 3.5–5.2)
ALP SERPL-CCNC: 114 U/L — SIGNIFICANT CHANGE UP (ref 30–115)
ALT FLD-CCNC: 146 U/L — HIGH (ref 0–41)
ANION GAP SERPL CALC-SCNC: 21 MMOL/L — HIGH (ref 7–14)
AST SERPL-CCNC: 80 U/L — HIGH (ref 0–41)
BASOPHILS # BLD AUTO: 0.05 K/UL — SIGNIFICANT CHANGE UP (ref 0–0.2)
BASOPHILS NFR BLD AUTO: 0.5 % — SIGNIFICANT CHANGE UP (ref 0–1)
BILIRUB DIRECT SERPL-MCNC: 0.5 MG/DL — HIGH (ref 0–0.2)
BILIRUB INDIRECT FLD-MCNC: 0.4 MG/DL — SIGNIFICANT CHANGE UP (ref 0.2–1.2)
BILIRUB SERPL-MCNC: 0.9 MG/DL — SIGNIFICANT CHANGE UP (ref 0.2–1.2)
BUN SERPL-MCNC: 45 MG/DL — HIGH (ref 10–20)
CALCIUM SERPL-MCNC: 9.3 MG/DL — SIGNIFICANT CHANGE UP (ref 8.5–10.1)
CHLORIDE SERPL-SCNC: 99 MMOL/L — SIGNIFICANT CHANGE UP (ref 98–110)
CHOLEST SERPL-MCNC: 111 MG/DL — SIGNIFICANT CHANGE UP
CO2 SERPL-SCNC: 21 MMOL/L — SIGNIFICANT CHANGE UP (ref 17–32)
CREAT SERPL-MCNC: 3.1 MG/DL — HIGH (ref 0.7–1.5)
EOSINOPHIL # BLD AUTO: 0.36 K/UL — SIGNIFICANT CHANGE UP (ref 0–0.7)
EOSINOPHIL NFR BLD AUTO: 3.9 % — SIGNIFICANT CHANGE UP (ref 0–8)
GLUCOSE BLDC GLUCOMTR-MCNC: 117 MG/DL — HIGH (ref 70–99)
GLUCOSE BLDC GLUCOMTR-MCNC: 168 MG/DL — HIGH (ref 70–99)
GLUCOSE BLDC GLUCOMTR-MCNC: 174 MG/DL — HIGH (ref 70–99)
GLUCOSE SERPL-MCNC: 103 MG/DL — HIGH (ref 70–99)
HCT VFR BLD CALC: 33.1 % — LOW (ref 42–52)
HDLC SERPL-MCNC: 30 MG/DL — LOW
HGB BLD-MCNC: 10.4 G/DL — LOW (ref 14–18)
IMM GRANULOCYTES NFR BLD AUTO: 0.2 % — SIGNIFICANT CHANGE UP (ref 0.1–0.3)
IRON SATN MFR SERPL: 32 UG/DL — LOW (ref 35–150)
IRON SATN MFR SERPL: 9 % — LOW (ref 15–50)
LIPID PNL WITH DIRECT LDL SERPL: 59 MG/DL — SIGNIFICANT CHANGE UP
LYMPHOCYTES # BLD AUTO: 1.53 K/UL — SIGNIFICANT CHANGE UP (ref 1.2–3.4)
LYMPHOCYTES # BLD AUTO: 16.7 % — LOW (ref 20.5–51.1)
MAGNESIUM SERPL-MCNC: 2.2 MG/DL — SIGNIFICANT CHANGE UP (ref 1.8–2.4)
MCHC RBC-ENTMCNC: 27.3 PG — SIGNIFICANT CHANGE UP (ref 27–31)
MCHC RBC-ENTMCNC: 31.4 G/DL — LOW (ref 32–37)
MCV RBC AUTO: 86.9 FL — SIGNIFICANT CHANGE UP (ref 80–94)
MONOCYTES # BLD AUTO: 0.97 K/UL — HIGH (ref 0.1–0.6)
MONOCYTES NFR BLD AUTO: 10.6 % — HIGH (ref 1.7–9.3)
NEUTROPHILS # BLD AUTO: 6.23 K/UL — SIGNIFICANT CHANGE UP (ref 1.4–6.5)
NEUTROPHILS NFR BLD AUTO: 68.1 % — SIGNIFICANT CHANGE UP (ref 42.2–75.2)
NON HDL CHOLESTEROL: 81 MG/DL — SIGNIFICANT CHANGE UP
NRBC # BLD: 0 /100 WBCS — SIGNIFICANT CHANGE UP (ref 0–0)
OSMOLALITY SERPL: 303 MOS/KG — HIGH (ref 280–301)
PHOSPHATE SERPL-MCNC: 4.8 MG/DL — SIGNIFICANT CHANGE UP (ref 2.1–4.9)
PLATELET # BLD AUTO: 282 K/UL — SIGNIFICANT CHANGE UP (ref 130–400)
POTASSIUM SERPL-MCNC: 3.6 MMOL/L — SIGNIFICANT CHANGE UP (ref 3.5–5)
POTASSIUM SERPL-SCNC: 3.6 MMOL/L — SIGNIFICANT CHANGE UP (ref 3.5–5)
PROT SERPL-MCNC: 7.2 G/DL — SIGNIFICANT CHANGE UP (ref 6–8)
RBC # BLD: 3.81 M/UL — LOW (ref 4.7–6.1)
RBC # BLD: 3.81 M/UL — LOW (ref 4.7–6.1)
RBC # FLD: 15.6 % — HIGH (ref 11.5–14.5)
RETICS #: 88.8 K/UL — SIGNIFICANT CHANGE UP (ref 25–125)
RETICS/RBC NFR: 2.3 % — HIGH (ref 0.5–1.5)
SODIUM SERPL-SCNC: 141 MMOL/L — SIGNIFICANT CHANGE UP (ref 135–146)
TIBC SERPL-MCNC: 340 UG/DL — SIGNIFICANT CHANGE UP (ref 220–430)
TRANSFERRIN SERPL-MCNC: 272 MG/DL — SIGNIFICANT CHANGE UP (ref 200–360)
TRIGL SERPL-MCNC: 87 MG/DL — SIGNIFICANT CHANGE UP
TROPONIN T SERPL-MCNC: 0.06 NG/ML — CRITICAL HIGH
TSH SERPL-MCNC: 1.42 UIU/ML — SIGNIFICANT CHANGE UP (ref 0.27–4.2)
UIBC SERPL-MCNC: 308 UG/DL — SIGNIFICANT CHANGE UP (ref 110–370)
WBC # BLD: 9.16 K/UL — SIGNIFICANT CHANGE UP (ref 4.8–10.8)
WBC # FLD AUTO: 9.16 K/UL — SIGNIFICANT CHANGE UP (ref 4.8–10.8)

## 2021-11-02 RX ADMIN — VALSARTAN 320 MILLIGRAM(S): 80 TABLET ORAL at 05:57

## 2021-11-02 RX ADMIN — HEPARIN SODIUM 5000 UNIT(S): 5000 INJECTION INTRAVENOUS; SUBCUTANEOUS at 05:48

## 2021-11-02 RX ADMIN — Medication 40 MILLIGRAM(S): at 05:48

## 2021-11-02 RX ADMIN — HEPARIN SODIUM 5000 UNIT(S): 5000 INJECTION INTRAVENOUS; SUBCUTANEOUS at 23:48

## 2021-11-02 RX ADMIN — Medication 3 MILLIGRAM(S): at 23:48

## 2021-11-02 RX ADMIN — Medication 81 MILLIGRAM(S): at 11:52

## 2021-11-02 RX ADMIN — ATORVASTATIN CALCIUM 80 MILLIGRAM(S): 80 TABLET, FILM COATED ORAL at 23:49

## 2021-11-02 RX ADMIN — Medication 50 MILLIGRAM(S): at 05:48

## 2021-11-02 RX ADMIN — Medication 50 MILLIGRAM(S): at 18:40

## 2021-11-02 RX ADMIN — Medication 1: at 13:38

## 2021-11-02 RX ADMIN — TAMSULOSIN HYDROCHLORIDE 0.4 MILLIGRAM(S): 0.4 CAPSULE ORAL at 23:49

## 2021-11-02 RX ADMIN — Medication 3 MILLIGRAM(S): at 01:05

## 2021-11-02 RX ADMIN — Medication 1000 UNIT(S): at 11:52

## 2021-11-02 RX ADMIN — Medication 40 MILLIGRAM(S): at 18:40

## 2021-11-02 NOTE — PROGRESS NOTE ADULT - SUBJECTIVE AND OBJECTIVE BOX
Name: MAAME IGLESIAS  Age: 70y  Gender: Male    Pt was seen and examined.   c/o:  feels much better this morning he says, denies chest pain, dyspnea    Allergies:  No Known Allergies      PHYSICAL EXAM:    Vitals:  T(C): 35.8 (11-01-21 @ 16:27), Max: 36.4 (11-01-21 @ 09:54)  HR: 78 (11-02-21 @ 05:51) (58 - 78)  BP: 176/91 (11-02-21 @ 05:51) (155/78 - 176/91)  RR: 18 (11-02-21 @ 05:51) (18 - 25)  SpO2: 99% (11-02-21 @ 05:51) (96% - 99%)  Wt(kg): --Vital Signs Last 24 Hrs  T(C): 35.8 (01 Nov 2021 16:27), Max: 36.4 (01 Nov 2021 09:54)  T(F): 96.4 (01 Nov 2021 16:27), Max: 97.6 (01 Nov 2021 09:54)  HR: 78 (02 Nov 2021 05:51) (58 - 78)  BP: 176/91 (02 Nov 2021 05:51) (155/78 - 176/91)  BP(mean): --  RR: 18 (02 Nov 2021 05:51) (18 - 25)  SpO2: 99% (02 Nov 2021 05:51) (96% - 99%)      NECK: Supple, No JVD  CHEST/LUNG: bibasilar fine rales  HEART: S1,S2, N1 Regular rate and rhythm; No murmurs, rubs, or gallops  ABDOMEN: Soft, Nontender, Nondistended; Bowel sounds present  EXTREMITIES:  2+ Peripheral Pulses, No clubbing, cyanosis, or edema      LABS:                        9.6    10.04 )-----------( 223      ( 01 Nov 2021 11:20 )             30.5     11-01    137  |  99  |  44<H>  ----------------------------<  133<H>  3.8   |  19  |  3.1<H>    Ca    9.0      01 Nov 2021 11:20  Mg     1.6     11-01    TPro  6.6  /  Alb  4.0  /  TBili  0.7  /  DBili  x   /  AST  124<H>  /  ALT  170<H>  /  AlkPhos  106  11-01    LIVER FUNCTIONS - ( 01 Nov 2021 11:20 )  Alb: 4.0 g/dL / Pro: 6.6 g/dL / ALK PHOS: 106 U/L / ALT: 170 U/L / AST: 124 U/L / GGT: x           CARDIAC MARKERS ( 01 Nov 2021 11:20 )  x     / 0.06 ng/mL / x     / x     / x            MEDICATIONS  (STANDING):  aspirin enteric coated 81 milliGRAM(s) Oral daily  atorvastatin 80 milliGRAM(s) Oral at bedtime  chlorhexidine 4% Liquid 1 Application(s) Topical daily  cholecalciferol 1000 Unit(s) Oral daily  dextrose 40% Gel 15 Gram(s) Oral once  dextrose 5%. 1000 milliLiter(s) (50 mL/Hr) IV Continuous <Continuous>  dextrose 5%. 1000 milliLiter(s) (100 mL/Hr) IV Continuous <Continuous>  dextrose 50% Injectable 25 Gram(s) IV Push once  dextrose 50% Injectable 12.5 Gram(s) IV Push once  dextrose 50% Injectable 25 Gram(s) IV Push once  furosemide   Injectable 40 milliGRAM(s) IV Push every 12 hours  glucagon  Injectable 1 milliGRAM(s) IntraMuscular once  heparin SubCutaneous Injection - Peds 5000 Unit(s) SubCutaneous every 8 hours  insulin lispro (ADMELOG) corrective regimen sliding scale   SubCutaneous three times a day before meals  melatonin 3 milliGRAM(s) Oral at bedtime  metoprolol tartrate 50 milliGRAM(s) Oral two times a day  pantoprazole    Tablet 40 milliGRAM(s) Oral before breakfast  tamsulosin 0.4 milliGRAM(s) Oral at bedtime  valsartan 320 milliGRAM(s) Oral daily        RADIOLOGY & ADDITIONAL TESTS:    Imaging Personally Reviewed:  [ ] YES  [ ] NO    A/P:  69 yo male, with PMH of CAD s/p CABG, HTN, DM II , DL, colon cancer sp colectomy with lung metastasis and liver metastasis s/p resection, severe AS, CKD IV, and BPH presented for SOB    # Shortness of breath / Acute systolic CHF exacerbation 2/2 AV Stenosis  # Pulmonary edema  # Critical AS    doing much better this morning  will cont on current Lasix 40mg IV q12  BNP/Cardiac markers noted  will get TTE in am hopefully to show a somewhat improved EF (last week EF=30% to 35%, during chf exacerab)    # Critical AS     needs evaluation for TAVR  will d/w Dr. Chaney in am      # CAD s/p CABG        will need cath to assess for obst CAD prior to TAVR       obviously CKD a major issue, Cont current meds as are    # Hypertension        uncontrolled - will add Amlodipine 5mg po q24    # dyslipidemia - cont statin    - cw ASA 81 mg daily  - on rosuvastatin 20 mg daily; start atorvastatin 80 mg inpatient  - cw metoprolol 50 mg q 12  - cw Diovan 320 mg daily  - Follow up Lipid panel  - Follow up TSH  - DASH TLC diet    # Troponinemia    - likely from renal failure and demand ischemia  - monitor trop  - no new EKG changes    # Transaminitis    - RUQ US Postoperative change, status post cholecystectomy.  - h/o liver metastasis and Hep C  - likely from congested liver    # prolonged QTc  # First degree AV block    - patient on metoprolol 50 q 12  - avoid QT prolonging agents; hold home escitalopram     # Colon Cancer with liver and lung metastasis    - s/p colectomy  - s/p liver resection  - Serial CT chest outpatient    # H/o Hepatitis C    - treated with INF  - follow up HepC level     # BPH    - cw Tamsulosin 0.4 mg daily    # CKD IV  # HAGMA    - follow up serum and urine Osm  - follow up urine lytes  - renally adjusted medication  - avoid nephrotoxins  - worsening renal function likely pre renal  - check phosphorus level     # pre- DM II    - A1c 6.3  - Not on any medication  - CC diet  - keep FS < 180 start bolus insulin if persistently high  - avoid hypoglycemia    # Normocytic anemia    - likely chronic, HD stable, no evidence of bleed  - follow up iron studies and retic count  - follow up folate and B12    # DVT ppx Heparin  # GI ppx protonix  # Diet DASH TLC CC  # full Code

## 2021-11-02 NOTE — PROGRESS NOTE ADULT - SUBJECTIVE AND OBJECTIVE BOX
SUBJECTIVE:    Patient is a 70y old Male who presents with a chief complaint of SOB Cough (02 Nov 2021 06:08). Currently admitted to medicine with the primary diagnosis of Acute CHF exacerbation.   Today is hospital day 1d. This morning he is resting comfortably in bed and reports no new issues or overnight events.     PAST MEDICAL & SURGICAL HISTORY  Colon cancer  liver and lung metastasis    3-vessel CAD  sp CABG    DMII (diabetes mellitus, type 2)    Hypertension    Dyslipidemia    Hepatitis C  treated INF    History of coronary artery bypass graft    History of appendectomy    History of resection of liver    H/O colectomy    S/P cholecystectomy      SOCIAL HISTORY:  Negative for smoking/alcohol/drug use.     ALLERGIES:  No Known Allergies    MEDICATIONS:  STANDING MEDICATIONS  aspirin enteric coated 81 milliGRAM(s) Oral daily  atorvastatin 80 milliGRAM(s) Oral at bedtime  chlorhexidine 4% Liquid 1 Application(s) Topical daily  cholecalciferol 1000 Unit(s) Oral daily  dextrose 40% Gel 15 Gram(s) Oral once  dextrose 5%. 1000 milliLiter(s) IV Continuous <Continuous>  dextrose 5%. 1000 milliLiter(s) IV Continuous <Continuous>  dextrose 50% Injectable 25 Gram(s) IV Push once  dextrose 50% Injectable 12.5 Gram(s) IV Push once  dextrose 50% Injectable 25 Gram(s) IV Push once  furosemide   Injectable 40 milliGRAM(s) IV Push every 12 hours  glucagon  Injectable 1 milliGRAM(s) IntraMuscular once  heparin SubCutaneous Injection - Peds 5000 Unit(s) SubCutaneous every 8 hours  insulin lispro (ADMELOG) corrective regimen sliding scale   SubCutaneous three times a day before meals  melatonin 3 milliGRAM(s) Oral at bedtime  metoprolol tartrate 50 milliGRAM(s) Oral two times a day  pantoprazole    Tablet 40 milliGRAM(s) Oral before breakfast  tamsulosin 0.4 milliGRAM(s) Oral at bedtime  valsartan 320 milliGRAM(s) Oral daily    PRN MEDICATIONS    VITALS:   T(F): 98.4  HR: 60  BP: 170/80  RR: 18  SpO2: 99%    LABS:                        10.4   9.16  )-----------( 282      ( 02 Nov 2021 07:55 )             33.1     11-02    141  |  99  |  45<H>  ----------------------------<  103<H>  3.6   |  21  |  3.1<H>    Ca    9.3      02 Nov 2021 07:55  Phos  4.8     11-02  Mg     2.2     11-02    TPro  7.2  /  Alb  4.3  /  TBili  0.9  /  DBili  0.5<H>  /  AST  80<H>  /  ALT  146<H>  /  AlkPhos  114  11-02    Troponin T, Serum: 0.06 ng/mL *HH* (11-02-21 @ 07:55)    CARDIAC MARKERS ( 02 Nov 2021 07:55 )  x     / 0.06 ng/mL / x     / x     / x      CARDIAC MARKERS ( 01 Nov 2021 11:20 )  x     / 0.06 ng/mL / x     / x     / x          RADIOLOGY:    so< from: US Abdomen Upper Quadrant Right (11.01.21 @ 14:54) >  IMPRESSION:    Postoperative change, status post cholecystectomy.    Right-sided pleural effusion.    < end of copied text >      PHYSICAL EXAM:  GEN: No acute distress  LUNGS: Clear to auscultation bilaterally, bilateral finer crackles in the lower lung fields, left > right.   HEART: S1/S2 present. RRR.   ABD: Soft, non-tender, non-distended. Bowel sounds present  EXT: Bilateral LE +2 pitting  NEURO: AAOX3

## 2021-11-03 LAB
ALBUMIN SERPL ELPH-MCNC: 4.2 G/DL — SIGNIFICANT CHANGE UP (ref 3.5–5.2)
ALP SERPL-CCNC: 112 U/L — SIGNIFICANT CHANGE UP (ref 30–115)
ALT FLD-CCNC: 106 U/L — HIGH (ref 0–41)
ANION GAP SERPL CALC-SCNC: 21 MMOL/L — HIGH (ref 7–14)
AST SERPL-CCNC: 56 U/L — HIGH (ref 0–41)
BILIRUB SERPL-MCNC: 0.9 MG/DL — SIGNIFICANT CHANGE UP (ref 0.2–1.2)
BUN SERPL-MCNC: 44 MG/DL — HIGH (ref 10–20)
CALCIUM SERPL-MCNC: 8.8 MG/DL — SIGNIFICANT CHANGE UP (ref 8.5–10.1)
CHLORIDE SERPL-SCNC: 98 MMOL/L — SIGNIFICANT CHANGE UP (ref 98–110)
CO2 SERPL-SCNC: 22 MMOL/L — SIGNIFICANT CHANGE UP (ref 17–32)
COVID-19 SPIKE DOMAIN AB INTERP: POSITIVE
COVID-19 SPIKE DOMAIN ANTIBODY RESULT: >250 U/ML — HIGH
CREAT SERPL-MCNC: 2.9 MG/DL — HIGH (ref 0.7–1.5)
FERRITIN SERPL-MCNC: 80 NG/ML — SIGNIFICANT CHANGE UP (ref 30–400)
FOLATE SERPL-MCNC: 9.9 NG/ML — SIGNIFICANT CHANGE UP
GLUCOSE BLDC GLUCOMTR-MCNC: 129 MG/DL — HIGH (ref 70–99)
GLUCOSE BLDC GLUCOMTR-MCNC: 144 MG/DL — HIGH (ref 70–99)
GLUCOSE BLDC GLUCOMTR-MCNC: 150 MG/DL — HIGH (ref 70–99)
GLUCOSE BLDC GLUCOMTR-MCNC: 98 MG/DL — SIGNIFICANT CHANGE UP (ref 70–99)
GLUCOSE SERPL-MCNC: 114 MG/DL — HIGH (ref 70–99)
HCT VFR BLD CALC: 32.7 % — LOW (ref 42–52)
HCV RNA SPEC NAA+PROBE-LOG IU: SIGNIFICANT CHANGE UP IU/ML
HCV RNA SPEC NAA+PROBE-LOG IU: SIGNIFICANT CHANGE UP LOGIU/ML
HGB BLD-MCNC: 10.3 G/DL — LOW (ref 14–18)
INR BLD: 1.48 RATIO — HIGH (ref 0.65–1.3)
MAGNESIUM SERPL-MCNC: 1.7 MG/DL — LOW (ref 1.8–2.4)
MCHC RBC-ENTMCNC: 26.9 PG — LOW (ref 27–31)
MCHC RBC-ENTMCNC: 31.5 G/DL — LOW (ref 32–37)
MCV RBC AUTO: 85.4 FL — SIGNIFICANT CHANGE UP (ref 80–94)
MELD SCORE WITH DIALYSIS: 24 POINTS — SIGNIFICANT CHANGE UP
MELD SCORE WITHOUT DIALYSIS: 21 POINTS — SIGNIFICANT CHANGE UP
NRBC # BLD: 0 /100 WBCS — SIGNIFICANT CHANGE UP (ref 0–0)
PLATELET # BLD AUTO: 283 K/UL — SIGNIFICANT CHANGE UP (ref 130–400)
POTASSIUM SERPL-MCNC: 3.2 MMOL/L — LOW (ref 3.5–5)
POTASSIUM SERPL-SCNC: 3.2 MMOL/L — LOW (ref 3.5–5)
PROT SERPL-MCNC: 7.1 G/DL — SIGNIFICANT CHANGE UP (ref 6–8)
PROTHROM AB SERPL-ACNC: 17 SEC — HIGH (ref 9.95–12.87)
RBC # BLD: 3.83 M/UL — LOW (ref 4.7–6.1)
RBC # FLD: 15.6 % — HIGH (ref 11.5–14.5)
SARS-COV-2 IGG+IGM SERPL QL IA: >250 U/ML — HIGH
SARS-COV-2 IGG+IGM SERPL QL IA: POSITIVE
SODIUM SERPL-SCNC: 141 MMOL/L — SIGNIFICANT CHANGE UP (ref 135–146)
VIT B12 SERPL-MCNC: >2000 PG/ML — HIGH (ref 232–1245)
WBC # BLD: 10.16 K/UL — SIGNIFICANT CHANGE UP (ref 4.8–10.8)
WBC # FLD AUTO: 10.16 K/UL — SIGNIFICANT CHANGE UP (ref 4.8–10.8)

## 2021-11-03 PROCEDURE — 93010 ELECTROCARDIOGRAM REPORT: CPT | Mod: 77

## 2021-11-03 PROCEDURE — 93306 TTE W/DOPPLER COMPLETE: CPT | Mod: 26

## 2021-11-03 PROCEDURE — 93010 ELECTROCARDIOGRAM REPORT: CPT

## 2021-11-03 RX ORDER — METOPROLOL TARTRATE 50 MG
50 TABLET ORAL ONCE
Refills: 0 | Status: COMPLETED | OUTPATIENT
Start: 2021-11-04 | End: 2021-11-03

## 2021-11-03 RX ORDER — AMLODIPINE BESYLATE 2.5 MG/1
5 TABLET ORAL AT BEDTIME
Refills: 0 | Status: DISCONTINUED | OUTPATIENT
Start: 2021-11-03 | End: 2021-11-05

## 2021-11-03 RX ORDER — METOPROLOL TARTRATE 50 MG
50 TABLET ORAL ONCE
Refills: 0 | Status: COMPLETED | OUTPATIENT
Start: 2021-11-04 | End: 2021-11-04

## 2021-11-03 RX ADMIN — Medication 50 MILLIGRAM(S): at 17:10

## 2021-11-03 RX ADMIN — Medication 50 MILLIGRAM(S): at 05:05

## 2021-11-03 RX ADMIN — Medication 1000 UNIT(S): at 11:40

## 2021-11-03 RX ADMIN — HEPARIN SODIUM 5000 UNIT(S): 5000 INJECTION INTRAVENOUS; SUBCUTANEOUS at 15:35

## 2021-11-03 RX ADMIN — Medication 81 MILLIGRAM(S): at 11:40

## 2021-11-03 RX ADMIN — ATORVASTATIN CALCIUM 80 MILLIGRAM(S): 80 TABLET, FILM COATED ORAL at 21:59

## 2021-11-03 RX ADMIN — HEPARIN SODIUM 5000 UNIT(S): 5000 INJECTION INTRAVENOUS; SUBCUTANEOUS at 05:06

## 2021-11-03 RX ADMIN — AMLODIPINE BESYLATE 5 MILLIGRAM(S): 2.5 TABLET ORAL at 21:59

## 2021-11-03 RX ADMIN — Medication 40 MILLIGRAM(S): at 17:10

## 2021-11-03 RX ADMIN — Medication 3 MILLIGRAM(S): at 21:59

## 2021-11-03 RX ADMIN — VALSARTAN 320 MILLIGRAM(S): 80 TABLET ORAL at 05:05

## 2021-11-03 RX ADMIN — Medication 40 MILLIGRAM(S): at 05:06

## 2021-11-03 RX ADMIN — PANTOPRAZOLE SODIUM 40 MILLIGRAM(S): 20 TABLET, DELAYED RELEASE ORAL at 06:13

## 2021-11-03 RX ADMIN — Medication 50 MILLIGRAM(S): at 23:18

## 2021-11-03 RX ADMIN — TAMSULOSIN HYDROCHLORIDE 0.4 MILLIGRAM(S): 0.4 CAPSULE ORAL at 21:58

## 2021-11-03 RX ADMIN — HEPARIN SODIUM 5000 UNIT(S): 5000 INJECTION INTRAVENOUS; SUBCUTANEOUS at 21:59

## 2021-11-03 NOTE — PATIENT PROFILE ADULT - 
ADDITIONAL INFORMATION
Per pt, received second dose of Moderna vaccination, does not want booster if available. States wife can documentation bring in the AM.

## 2021-11-03 NOTE — PROGRESS NOTE ADULT - SUBJECTIVE AND OBJECTIVE BOX
24H events:    Patient is a 70y old Male who presents with a chief complaint of SOB  Cough (02 Nov 2021 12:55)    Primary diagnosis of Acute CHF       Today is hospital day 2d. This morning patient was seen and examined at bedside, resting comfortably in bed.    No acute or major events overnight.    PAST MEDICAL & SURGICAL HISTORY  Colon cancer  liver and lung metastasis    3-vessel CAD  sp CABG    DMII (diabetes mellitus, type 2)    Hypertension    Dyslipidemia    Hepatitis C  treated INF    History of coronary artery bypass graft    History of appendectomy    History of resection of liver    H/O colectomy    S/P cholecystectomy      SOCIAL HISTORY:  Social History:  former smoker stopped 40 years ago smoked for 30 years 1 pack and half per day  social alcohol consumer  lives at home with wife independent  worked as  (01 Nov 2021 15:41)      ALLERGIES:  No Known Allergies    MEDICATIONS:  STANDING MEDICATIONS  aspirin enteric coated 81 milliGRAM(s) Oral daily  atorvastatin 80 milliGRAM(s) Oral at bedtime  chlorhexidine 4% Liquid 1 Application(s) Topical daily  cholecalciferol 1000 Unit(s) Oral daily  dextrose 40% Gel 15 Gram(s) Oral once  dextrose 5%. 1000 milliLiter(s) IV Continuous <Continuous>  dextrose 5%. 1000 milliLiter(s) IV Continuous <Continuous>  dextrose 50% Injectable 25 Gram(s) IV Push once  dextrose 50% Injectable 12.5 Gram(s) IV Push once  dextrose 50% Injectable 25 Gram(s) IV Push once  furosemide   Injectable 40 milliGRAM(s) IV Push every 12 hours  glucagon  Injectable 1 milliGRAM(s) IntraMuscular once  heparin SubCutaneous Injection - Peds 5000 Unit(s) SubCutaneous every 8 hours  insulin lispro (ADMELOG) corrective regimen sliding scale   SubCutaneous three times a day before meals  melatonin 3 milliGRAM(s) Oral at bedtime  metoprolol tartrate 50 milliGRAM(s) Oral two times a day  pantoprazole    Tablet 40 milliGRAM(s) Oral before breakfast  tamsulosin 0.4 milliGRAM(s) Oral at bedtime  valsartan 320 milliGRAM(s) Oral daily    PRN MEDICATIONS    VITALS:   T(F): 98.2  HR: 77  BP: 170/91  RR: 18  SpO2: 98%    PHYSICAL EXAM:  GENERAL: NAD, well-groomed, well-developed  HEAD:  Atraumatic, Normocephalic  EYES: EOMI  NECK: Supple, +JVD  NERVOUS SYSTEM:  Alert & Oriented X3, non focal   CHEST/LUNG: bibasilar crackles  HEART: Regular rate and rhythm;   ABDOMEN: Soft, Nontender, Nondistended; Bowel sounds present  EXTREMITIES:  2+ Peripheral Pulses, No clubbing, cyanosis, or edema  LYMPH: No lymphadenopathy noted  SKIN: No rashes or lesions  LABS:                        10.3   10.16 )-----------( 283      ( 03 Nov 2021 11:24 )             32.7     11-03    141  |  98  |  44<H>  ----------------------------<  114<H>  3.2<L>   |  22  |  2.9<H>    Ca    8.8      03 Nov 2021 11:24  Phos  4.8     11-02  Mg     1.7     11-03    TPro  7.1  /  Alb  4.2  /  TBili  0.9  /  DBili  x   /  AST  56<H>  /  ALT  106<H>  /  AlkPhos  112  11-03    PT/INR - ( 03 Nov 2021 11:24 )   PT: 17.00 sec;   INR: 1.48 ratio                   CARDIAC MARKERS ( 02 Nov 2021 07:55 )  x     / 0.06 ng/mL / x     / x     / x          RADIOLOGY:

## 2021-11-03 NOTE — PROGRESS NOTE ADULT - SUBJECTIVE AND OBJECTIVE BOX
Name: MAAME IGLESIAS  Age: 70y  Gender: Male    Pt was seen and examined.   c/o:  feels much better  dyspnea much much better he says    Allergies:  No Known Allergies      PHYSICAL EXAM:    Vitals:  T(C): 36.8 (11-03-21 @ 04:53), Max: 36.9 (11-02-21 @ 15:35)  HR: 77 (11-03-21 @ 04:53) (61 - 84)  BP: 170/91 (11-03-21 @ 04:53) (141/70 - 170/91)  RR: 18 (11-03-21 @ 04:53) (18 - 19)  SpO2: 98% (11-03-21 @ 04:53) (98% - 99%)  Wt(kg): --Vital Signs Last 24 Hrs  T(C): 36.8 (03 Nov 2021 04:53), Max: 36.9 (02 Nov 2021 15:35)  T(F): 98.2 (03 Nov 2021 04:53), Max: 98.4 (02 Nov 2021 15:35)  HR: 77 (03 Nov 2021 04:53) (61 - 84)  BP: 170/91 (03 Nov 2021 04:53) (141/70 - 170/91)  BP(mean): --  RR: 18 (03 Nov 2021 04:53) (18 - 19)  SpO2: 98% (03 Nov 2021 04:53) (98% - 99%)      NECK: Supple, No JVD  CHEST/LUNG: CTA, B/L, No rales, rhonchi, wheezing, or rubs  HEART: S1,S2, N1 Regular rate and rhythm; 4/6 RSB murmur, rubs, or gallops  ABDOMEN: Soft, Nontender, Nondistended; Bowel sounds present  EXTREMITIES:  2+ Peripheral Pulses, No clubbing, cyanosis, or edema      LABS:                        10.3   10.16 )-----------( 283      ( 03 Nov 2021 11:24 )             32.7     11-03    141  |  98  |  44<H>  ----------------------------<  114<H>  3.2<L>   |  22  |  2.9<H>    Ca    8.8      03 Nov 2021 11:24  Phos  4.8     11-02  Mg     1.7     11-03    TPro  7.1  /  Alb  4.2  /  TBili  0.9  /  DBili  x   /  AST  56<H>  /  ALT  106<H>  /  AlkPhos  112  11-03    LIVER FUNCTIONS - ( 03 Nov 2021 11:24 )  Alb: 4.2 g/dL / Pro: 7.1 g/dL / ALK PHOS: 112 U/L / ALT: 106 U/L / AST: 56 U/L / GGT: x           CARDIAC MARKERS ( 02 Nov 2021 07:55 )  x     / 0.06 ng/mL / x     / x     / x            MEDICATIONS  (STANDING):  aspirin enteric coated 81 milliGRAM(s) Oral daily  atorvastatin 80 milliGRAM(s) Oral at bedtime  chlorhexidine 4% Liquid 1 Application(s) Topical daily  cholecalciferol 1000 Unit(s) Oral daily  dextrose 40% Gel 15 Gram(s) Oral once  dextrose 5%. 1000 milliLiter(s) (50 mL/Hr) IV Continuous <Continuous>  dextrose 5%. 1000 milliLiter(s) (100 mL/Hr) IV Continuous <Continuous>  dextrose 50% Injectable 25 Gram(s) IV Push once  dextrose 50% Injectable 12.5 Gram(s) IV Push once  dextrose 50% Injectable 25 Gram(s) IV Push once  furosemide   Injectable 40 milliGRAM(s) IV Push every 12 hours  glucagon  Injectable 1 milliGRAM(s) IntraMuscular once  heparin SubCutaneous Injection - Peds 5000 Unit(s) SubCutaneous every 8 hours  insulin lispro (ADMELOG) corrective regimen sliding scale   SubCutaneous three times a day before meals  melatonin 3 milliGRAM(s) Oral at bedtime  metoprolol tartrate 50 milliGRAM(s) Oral two times a day  pantoprazole    Tablet 40 milliGRAM(s) Oral before breakfast  tamsulosin 0.4 milliGRAM(s) Oral at bedtime  valsartan 320 milliGRAM(s) Oral daily        RADIOLOGY & ADDITIONAL TESTS:    Imaging Personally Reviewed:  [ ] YES  [ ] NO    A/P:  69 yo male, with PMH of CAD s/p CABG, HTN, DM II , DL, colon cancer sp colectomy with lung metastasis and liver metastasis s/p resection, severe AS, CKD IV, and BPH presented for SOB    # Shortness of breath / Acute systolic CHF exacerbation 2/2 AV Stenosis  # Pulmonary edema  # Critical AS    doing much better  will cont on current Lasix 40mg IV q12 for one more day  BNP/Cardiac markers noted  will get TTE in am hopefully to show a somewhat improved EF (last week EF=30% to 35%, during chf exacerab)    # Critical AS     needs evaluation for TAVR, pt is not a candidate for AVR nor does he want one  d/w Dr. Chaney in am , will start w/u  will obtain a CCTA in hopes of r/o obstructive CAD to try and avoid a coronary angio       # CAD s/p CABG        will need cath to assess for obst CAD prior to TAVR       obviously CKD a major issue, so will d/w pt risks of HD     # Hypertension        uncontrolled - will add Amlodipine 5mg po q24    # dyslipidemia - cont statin    - cw ASA 81 mg daily  - on rosuvastatin 20 mg daily; start atorvastatin 80 mg inpatient  - cw metoprolol 50 mg q 12  - cw Diovan 320 mg daily  - Follow up Lipid panel  - Follow up TSH  - DASH TLC diet    # Troponinemia    - likely from renal failure and demand ischemia  - monitor trop  - no new EKG changes    # Transaminitis    - RUQ US Postoperative change, status post cholecystectomy.  - h/o liver metastasis and Hep C  - likely from congested liver    # prolonged QTc  # First degree AV block    - patient on metoprolol 50 q 12  - avoid QT prolonging agents; hold home escitalopram     # Colon Cancer with liver and lung metastasis    - s/p colectomy  - s/p liver resection  - Serial CT chest outpatient    # H/o Hepatitis C    - treated with INF  - follow up HepC level     # BPH    - cw Tamsulosin 0.4 mg daily    # CKD IV  # HAGMA    - follow up serum and urine Osm  - follow up urine lytes  - renally adjusted medication  - avoid nephrotoxins  - worsening renal function likely pre renal  - check phosphorus level     # pre- DM II    - A1c 6.3  - Not on any medication  - CC diet  - keep FS < 180 start bolus insulin if persistently high  - avoid hypoglycemia    # Normocytic anemia    - likely chronic, HD stable, no evidence of bleed  - follow up iron studies and retic count  - follow up folate and B12    # DVT ppx Heparin  # GI ppx protonix  # Diet DASH TLC CC  # full Code

## 2021-11-03 NOTE — PROGRESS NOTE ADULT - ASSESSMENT
69 yo male, with PMH of CAD s/p CABG, HTN, DM II , DL, colon cancer sp colectomy with lung metastasis and liver metastasis s/p resection, severe AS, CKD IV, and BPH presented for SOB    # Acute CHF exacerbation 2/2 Aortic Stenosis - improving  # Pulmonary edema  # Critical AS  - C/w Lasix 40mg IV q12 for one more day  -  F/u TTE in am hopefully to show a somewhat improved EF (last week EF=30% to 35%, during chf exacerab)  - Needs evaluation for TAVR, pt is not a candidate for AVR nor does he want one  - CCTA in hopes of r/o obstructive CAD to try and avoid a coronary angio       # CAD s/p CABG  - will need cath to assess for obst CAD prior to TAVR     # Hypertension - uncontrolled   - add Amlodipine 5mg po q24    # Dyslipidemia  - cw ASA 81 mg daily  - on rosuvastatin 20 mg daily; start atorvastatin 80 mg inpatient  - cw metoprolol 50 mg q 12  - cw Diovan 320 mg daily  - Follow up Lipid panel  - Follow up TSH  - DASH TLC diet    # Transaminitis  - RUQ US Postoperative change, status post cholecystectomy.  - h/o liver metastasis and Hep C  - likely from congested liver    # prolonged QTc  # First degree AV block  - patient on metoprolol 50 q 12  - avoid QT prolonging agents; hold home escitalopram     # Colon Cancer with liver and lung metastasis  - s/p colectomy  - s/p liver resection  - Serial CT chest outpatient    # H/o Hepatitis C  - treated with INF  - follow up HepC level     # BPH  - cw Tamsulosin 0.4 mg daily    # CKD IV  # HAGMA  - follow up serum and urine Osm  - follow up urine lytes  - renally adjusted medication  - avoid nephrotoxins  - worsening renal function likely pre renal  - check phosphorus level     # pre- DM II  - A1c 6.3  - Not on any medication  - CC diet  - keep FS < 180 start bolus insulin if persistently high  - avoid hypoglycemia    # Normocytic anemia  - likely chronic, HD stable, no evidence of bleed  - follow up iron studies and retic count  - follow up folate and B12    # DVT ppx Heparin  # GI ppx protonix  # Diet DASH TLC CC  # full Code

## 2021-11-03 NOTE — CHART NOTE - NSCHARTNOTEFT_GEN_A_CORE
Hold morning lasix and restart after CT scan for TAVR  add metoprolol 50 mg x1 at midnight   Total of 100 mg of metoprolol at 6 am  CT heart/A/P ordered with TAVR protocol

## 2021-11-04 LAB
ALBUMIN SERPL ELPH-MCNC: 4.1 G/DL — SIGNIFICANT CHANGE UP (ref 3.5–5.2)
ALP SERPL-CCNC: 108 U/L — SIGNIFICANT CHANGE UP (ref 30–115)
ALT FLD-CCNC: 88 U/L — HIGH (ref 0–41)
ANION GAP SERPL CALC-SCNC: 19 MMOL/L — HIGH (ref 7–14)
AST SERPL-CCNC: 49 U/L — HIGH (ref 0–41)
BASOPHILS # BLD AUTO: 0.05 K/UL — SIGNIFICANT CHANGE UP (ref 0–0.2)
BASOPHILS NFR BLD AUTO: 0.6 % — SIGNIFICANT CHANGE UP (ref 0–1)
BILIRUB SERPL-MCNC: 1.3 MG/DL — HIGH (ref 0.2–1.2)
BUN SERPL-MCNC: 42 MG/DL — HIGH (ref 10–20)
CALCIUM SERPL-MCNC: 8.7 MG/DL — SIGNIFICANT CHANGE UP (ref 8.5–10.1)
CHLORIDE SERPL-SCNC: 100 MMOL/L — SIGNIFICANT CHANGE UP (ref 98–110)
CO2 SERPL-SCNC: 24 MMOL/L — SIGNIFICANT CHANGE UP (ref 17–32)
CREAT SERPL-MCNC: 3.1 MG/DL — HIGH (ref 0.7–1.5)
EOSINOPHIL # BLD AUTO: 0.34 K/UL — SIGNIFICANT CHANGE UP (ref 0–0.7)
EOSINOPHIL NFR BLD AUTO: 4.2 % — SIGNIFICANT CHANGE UP (ref 0–8)
GLUCOSE BLDC GLUCOMTR-MCNC: 115 MG/DL — HIGH (ref 70–99)
GLUCOSE BLDC GLUCOMTR-MCNC: 125 MG/DL — HIGH (ref 70–99)
GLUCOSE BLDC GLUCOMTR-MCNC: 175 MG/DL — HIGH (ref 70–99)
GLUCOSE BLDC GLUCOMTR-MCNC: 228 MG/DL — HIGH (ref 70–99)
GLUCOSE SERPL-MCNC: 115 MG/DL — HIGH (ref 70–99)
HCT VFR BLD CALC: 32.6 % — LOW (ref 42–52)
HGB BLD-MCNC: 10.2 G/DL — LOW (ref 14–18)
IMM GRANULOCYTES NFR BLD AUTO: 0.2 % — SIGNIFICANT CHANGE UP (ref 0.1–0.3)
LYMPHOCYTES # BLD AUTO: 1.61 K/UL — SIGNIFICANT CHANGE UP (ref 1.2–3.4)
LYMPHOCYTES # BLD AUTO: 19.8 % — LOW (ref 20.5–51.1)
MAGNESIUM SERPL-MCNC: 1.6 MG/DL — LOW (ref 1.8–2.4)
MCHC RBC-ENTMCNC: 26.7 PG — LOW (ref 27–31)
MCHC RBC-ENTMCNC: 31.3 G/DL — LOW (ref 32–37)
MCV RBC AUTO: 85.3 FL — SIGNIFICANT CHANGE UP (ref 80–94)
MONOCYTES # BLD AUTO: 0.98 K/UL — HIGH (ref 0.1–0.6)
MONOCYTES NFR BLD AUTO: 12 % — HIGH (ref 1.7–9.3)
NEUTROPHILS # BLD AUTO: 5.15 K/UL — SIGNIFICANT CHANGE UP (ref 1.4–6.5)
NEUTROPHILS NFR BLD AUTO: 63.2 % — SIGNIFICANT CHANGE UP (ref 42.2–75.2)
NRBC # BLD: 0 /100 WBCS — SIGNIFICANT CHANGE UP (ref 0–0)
PLATELET # BLD AUTO: 273 K/UL — SIGNIFICANT CHANGE UP (ref 130–400)
POTASSIUM SERPL-MCNC: 3.4 MMOL/L — LOW (ref 3.5–5)
POTASSIUM SERPL-SCNC: 3.4 MMOL/L — LOW (ref 3.5–5)
PROT SERPL-MCNC: 6.9 G/DL — SIGNIFICANT CHANGE UP (ref 6–8)
RBC # BLD: 3.82 M/UL — LOW (ref 4.7–6.1)
RBC # FLD: 15.4 % — HIGH (ref 11.5–14.5)
SODIUM SERPL-SCNC: 143 MMOL/L — SIGNIFICANT CHANGE UP (ref 135–146)
WBC # BLD: 8.15 K/UL — SIGNIFICANT CHANGE UP (ref 4.8–10.8)
WBC # FLD AUTO: 8.15 K/UL — SIGNIFICANT CHANGE UP (ref 4.8–10.8)

## 2021-11-04 PROCEDURE — 74174 CTA ABD&PLVS W/CONTRAST: CPT | Mod: 26

## 2021-11-04 PROCEDURE — 75572 CT HRT W/3D IMAGE: CPT | Mod: 26

## 2021-11-04 PROCEDURE — 99222 1ST HOSP IP/OBS MODERATE 55: CPT

## 2021-11-04 RX ORDER — FUROSEMIDE 40 MG
40 TABLET ORAL DAILY
Refills: 0 | Status: DISCONTINUED | OUTPATIENT
Start: 2021-11-05 | End: 2021-11-05

## 2021-11-04 RX ORDER — POTASSIUM CHLORIDE 20 MEQ
40 PACKET (EA) ORAL ONCE
Refills: 0 | Status: COMPLETED | OUTPATIENT
Start: 2021-11-04 | End: 2021-11-04

## 2021-11-04 RX ORDER — MAGNESIUM SULFATE 500 MG/ML
2 VIAL (ML) INJECTION
Refills: 0 | Status: COMPLETED | OUTPATIENT
Start: 2021-11-04 | End: 2021-11-04

## 2021-11-04 RX ORDER — FUROSEMIDE 40 MG
40 TABLET ORAL
Refills: 0 | Status: DISCONTINUED | OUTPATIENT
Start: 2021-11-04 | End: 2021-11-04

## 2021-11-04 RX ADMIN — HEPARIN SODIUM 5000 UNIT(S): 5000 INJECTION INTRAVENOUS; SUBCUTANEOUS at 13:53

## 2021-11-04 RX ADMIN — Medication 50 MILLIGRAM(S): at 05:04

## 2021-11-04 RX ADMIN — PANTOPRAZOLE SODIUM 40 MILLIGRAM(S): 20 TABLET, DELAYED RELEASE ORAL at 06:22

## 2021-11-04 RX ADMIN — HEPARIN SODIUM 5000 UNIT(S): 5000 INJECTION INTRAVENOUS; SUBCUTANEOUS at 21:27

## 2021-11-04 RX ADMIN — Medication 50 MILLIGRAM(S): at 17:43

## 2021-11-04 RX ADMIN — Medication 25 GRAM(S): at 13:53

## 2021-11-04 RX ADMIN — ATORVASTATIN CALCIUM 80 MILLIGRAM(S): 80 TABLET, FILM COATED ORAL at 21:26

## 2021-11-04 RX ADMIN — AMLODIPINE BESYLATE 5 MILLIGRAM(S): 2.5 TABLET ORAL at 21:26

## 2021-11-04 RX ADMIN — Medication 1000 UNIT(S): at 13:52

## 2021-11-04 RX ADMIN — TAMSULOSIN HYDROCHLORIDE 0.4 MILLIGRAM(S): 0.4 CAPSULE ORAL at 21:27

## 2021-11-04 RX ADMIN — Medication 3 MILLIGRAM(S): at 21:26

## 2021-11-04 RX ADMIN — Medication 2: at 17:43

## 2021-11-04 RX ADMIN — Medication 81 MILLIGRAM(S): at 13:52

## 2021-11-04 RX ADMIN — Medication 25 GRAM(S): at 17:43

## 2021-11-04 RX ADMIN — Medication 40 MILLIEQUIVALENT(S): at 13:52

## 2021-11-04 RX ADMIN — VALSARTAN 320 MILLIGRAM(S): 80 TABLET ORAL at 05:04

## 2021-11-04 RX ADMIN — HEPARIN SODIUM 5000 UNIT(S): 5000 INJECTION INTRAVENOUS; SUBCUTANEOUS at 05:04

## 2021-11-04 NOTE — PROGRESS NOTE ADULT - SUBJECTIVE AND OBJECTIVE BOX
Patient Information:  MAAME IGLESIAS / 70y / Male / MRN#:[id number]    Hospital Day: 3d    Interval History:  Patient seen and examined at bedside. No acute events overnight. Pt standing at bedside, appears comfortable on room air, states that his breathing is significantly better although agitated that he could not have breakfast this morning.    Past Medical History:  Liver cancer    Rectal bleed    Colon cancer    3-vessel CAD    DMII (diabetes mellitus, type 2)    Hypertension    Dyslipidemia    Hepatitis C      Past Surgical History:  History of coronary artery bypass graft    History of appendectomy    History of resection of liver    H/O colectomy    S/P cholecystectomy      Allergies:  No Known Allergies    Medications:  PRN:    Standing:  amLODIPine   Tablet 5 milliGRAM(s) Oral at bedtime  aspirin enteric coated 81 milliGRAM(s) Oral daily  atorvastatin 80 milliGRAM(s) Oral at bedtime  chlorhexidine 4% Liquid 1 Application(s) Topical daily  cholecalciferol 1000 Unit(s) Oral daily  dextrose 40% Gel 15 Gram(s) Oral once  dextrose 5%. 1000 milliLiter(s) (50 mL/Hr) IV Continuous <Continuous>  dextrose 5%. 1000 milliLiter(s) (100 mL/Hr) IV Continuous <Continuous>  dextrose 50% Injectable 25 Gram(s) IV Push once  dextrose 50% Injectable 12.5 Gram(s) IV Push once  dextrose 50% Injectable 25 Gram(s) IV Push once  glucagon  Injectable 1 milliGRAM(s) IntraMuscular once  heparin SubCutaneous Injection - Peds 5000 Unit(s) SubCutaneous every 8 hours  insulin lispro (ADMELOG) corrective regimen sliding scale   SubCutaneous three times a day before meals  melatonin 3 milliGRAM(s) Oral at bedtime  metoprolol tartrate 50 milliGRAM(s) Oral two times a day  pantoprazole    Tablet 40 milliGRAM(s) Oral before breakfast  tamsulosin 0.4 milliGRAM(s) Oral at bedtime  valsartan 320 milliGRAM(s) Oral daily    Home:  aspirin 81 mg oral tablet:   bumetanide 2 mg oral tablet: 1 tab(s) orally once a day  cyanocobalamin:   Diovan 160 mg oral tablet: 1 tab(s) orally once a day  escitalopram 10 mg oral tablet: 1 tab(s) orally once a day  Metoprolol Tartrate 50 mg oral tablet: 1 tab(s) orally 2 times a day  pantoprazole 40 mg oral delayed release tablet: 1 tab(s) orally once a day  rosuvastatin 20 mg oral tablet: 1 tab(s) orally once a day  tamsulosin 0.4 mg oral capsule: 1 cap(s) orally once a day  Vitamin D3 25 mcg (1000 intl units) oral tablet: 1 tab(s) orally once a day    Vitals:  T(C): 36.2, Max: 36.2 (11-04-21 @ 04:37)  T(F): 97.1, Max: 97.1 (11-04-21 @ 04:37)  HR: 67 (59 - 67)  BP: 143/68 (139/71 - 168/78)  RR: 18 (18 - 18)  SpO2: 98% (98% - 98%)    Physical Exam:  General: Awake, alert, NAD, standing up at bedside, on RA  HEENT: Head NC/AT  Heart: RRR; loud systolic murmur apprec  Lungs: Dec breath sounds, most pronounced in R lung base  Abdomen: Soft, nontender, nondistended, BS+  Extremities: No edema, clubbing, cyanosis in upper or lower extremities  Neuro: AAOx3, NFD    Labs:  CBC (11-04 @ 06:24)                        Hgb: 10.2   WBC: 8.15  )-----------------( Plts: 273                              Hct: 32.6     Chem (11-04 @ 06:24)  Na: 143  |     Cl: 100     |  BUN: 42  -----------------------------------------< Gluc: 115    K: 3.4   |    HCO3: 24  |  Cr: 3.1    Ca 8.7 (11-04 @ 06:24)  Mg 1.6 (11-04 @ 06:24)    LFTs (11-04 @ 06:24)  TPro 6.9  /  Alb 4.1  TBili 1.3  /  DBili     AST 49  /  ALT 88  /  AlkPhos 108    Cardiac Markers (11-02 @ 07:55)  Troponin I X  Troponin T 0.06  CK X  CKMB X  CKMB Units X  Myoglobin X  Lactate X  ESR X    Cardiac Markers (11-01 @ 11:20)  Troponin I X  Troponin T 0.06  CK X  CKMB X  CKMB Units X  Myoglobin X  Lactate X  ESR X        PT/INR (11-03 @ 11:24)  PT: 17.00; INR: 1.48   PTT:

## 2021-11-04 NOTE — CONSULT NOTE ADULT - ATTENDING COMMENTS
The patient is a 70-year-old gentleman we were asked to evaluate by  The patient is a 70-year-old gentleman we were asked to evaluate by Dr. Chaney and Dr. Brown for his cardiac condition.    The patient has a long and complicated cardiac history having undergone minimally invasive direct coronary artery bypass grafting through a left thoracotomy by Dr. Blackburn in 2013 and the patient tells me that at the time he underwent a triple-vessel coronary artery bypass graft.  He was apparently having unstable angina at the time.    In 2017 the patient was diagnosed with colon cancer and apparently at the time of the diagnosis it was not advanced age but following his surgery for colon cancer he was found to have as part of his follow-up metastatic disease to the liver which was unresponsive apparently to conventional therapy and he had to undergo surgical resection at the time.  He subsequently developed metastatic disease to the lungs but apparently has been cancer free for the last couple of years.    The patient has a history of hypertension and poorly controlled diabetes and is also known to have advanced chronic kidney disease with at least stage IV chronic kidney disease.    On inquiry the patient tells me that he is known to have a cardiac murmur at least for the last 25 years but was only told about his aortic stenosis a couple of years ago and has been on follow-up for the same.    Recently he had been complaining of increasing symptoms of shortness of breath with dyspnea on exertion and apparently was admitted with congestive heart failure.    The patient also has a history of hepatitis C for which he is taking treatment.    The patient tells me that he lives at home with his wife and has grown children.    I had a chance to review the patient's echocardiogram and it does show severe aortic stenosis as described above.    I explained to the patient that he does have significant aortic stenosis and would probably benefit from an intervention.  Given his age, reoperative status, advanced chronic kidney disease as well as a history of metastatic colon cancer in the recent past, his best option would be a full work-up for a transcatheter aortic valve replacement.  He would be an extremely high risk surgical candidate for reoperative open heart surgery and the risk of dialysis would be very significant.    Currently the patient wants to think about his options before agreeing to any intervention.  I will discuss his care with his referring cardiologist and we will discuss his care in a heart team approach to come up with the best option for the patient.    The patient had multiple opportunities to ask questions and these were all answered.  By the time I left the bedside he was happy with the visit and is promised to follow-up with all his physicians in a timely fashion.

## 2021-11-04 NOTE — PROGRESS NOTE ADULT - ASSESSMENT
71 yo M with PMHx of CAD s/p CABG, HTN, prediabetes, DLD, Colon Ca s/p colectomy w lung mets, liver mets s/p resection, severe AS, CKD IV, BPH, hx of Hep C s/p tx presents with dyspnea.    #Acute CHF Exacerbation   #Severe symptomatic AS  #CAD s/p CABG  - CXR - bilateral pleural effusions on presentation  - s/p diuresis with IV Lasix with improvement of dyspnea  - TTE EF 57%, severe AS, GIIDD  - Dr. Chaney following - given pt's symptomatic AS, would benefit from TAVR, however, given CKD, would be best to hold off on coronary angiogram and obtain CTA  - Pending CT Chest and Abdomen today  - Lasix held today, resume after CT    #HTN  - C/w Amlodipine, Valsartan    #Prediabetes  - HbA1c 6.3 in September  - C/w SS, escalate as needed    #CKD IV  - Baseline Cr ~ 2.5, now 3  - Cont to monitor, avoid nephrotoxic agents    #Transaminitis  - Trending down, may be related to congestive hepatopathy   - RUQ US revealed no abnormalities    #Colon Ca s/p Colectomy with lung mets and liver mets s/p resection  #Hx of Hep C s/p tx   #BPH - C/w Tamsulosin    DVT ppx: HSQ  GI ppx: PPI  Diet: DASH, CC, fluid restriction  FUll Code  Dispo: Acute 69 yo M with PMHx of CAD s/p CABG, HTN, prediabetes, DLD, Colon Ca s/p colectomy w lung mets, liver mets s/p resection, severe AS, CKD IV, BPH, hx of Hep C s/p tx presents with dyspnea.    #Acute CHF Exacerbation   #Severe symptomatic AS  #CAD s/p CABG    - s/p diuresis with IV Lasix with improvement of dyspnea  - TTE EF 57%, BUT this is NOT accurate, His LVEF is around 40% to 45%.  - Structural cardiology, Dr. Chaney consulted     pt needs AVR but not a candidate so TAVR, indicated     CCTA ordered by Dr. Chaney   - Pending CT Chest and Abdomen today   although he does have metastatic colon ca it has been stable for about 5 years now as unusual as it may sound  so he is still a candidate for TAVR  - Lasix held today, resume after CT    #HTN  - C/w Amlodipine, Valsartan    #Prediabetes  - HbA1c 6.3 in September  - C/w SS, escalate as needed    #CKD IV  - Baseline Cr ~ 2.5, now 3  - Cont to monitor, avoid nephrotoxic agents    #Transaminitis  - Trending down, may be related to congestive hepatopathy   - RUQ US revealed no abnormalities    #Colon Ca s/p Colectomy with lung mets and liver mets s/p resection, stable mets x 5 years  #Hx of Hep C s/p tx   #BPH - C/w Tamsulosin    DVT ppx: HSQ  GI ppx: PPI  Diet: DASH, CC, fluid restriction  FUll Code  Dispo: Acute

## 2021-11-04 NOTE — CONSULT NOTE ADULT - SUBJECTIVE AND OBJECTIVE BOX
CC: Dyspnea    HPI:  70 year-old  male with a cardiac history of CAD s/p CABG (MICS), progressive AS, and chronic diastolic CHF.    Notable comorbidities include DM, CKD, and metastatic colon cancer.    Presented with subacute decompensated CHF (volume overload).  Symptoms include exertional dyspnea, cough, and LE edema.    No chest pain.  Occasional lightheadedness.  No syncope.    Symptomatic improvement with diuresis.    Hemodynamics stable.    ECHO Reviewed:  Low-normal LVSF.  Heavily calcified AV with severe high gradient AS.  Mild MR.  Moderate TR.  Possible pulm HTN.  Mild RV dysfunction.    PMH / PSH:  Cardiac (above)  DM  HTN  HLD  CKD  Depression / Anxiety  GERD  BPH  HCV s/p IN  Metastatic colon cancer (liver / lung) s/p colectomy, liver resection, chemotherapy (dx 2007)  Appendectomy  Cholecystectomy    SOCIAL:  Remote 30-py smoker.  Retired   Independent ADL's    VITALS:  T(C): 36.2 (11-04-21 @ 04:37), Max: 36.2 (11-04-21 @ 04:37)  HR: 67 (11-04-21 @ 04:37) (59 - 67)  BP: 143/68 (11-04-21 @ 04:37) (139/71 - 168/78)  RR: 18 (11-04-21 @ 04:37) (18 - 18)  SpO2: 98% (11-04-21 @ 04:37) (98% - 98%)    I&O's Summary    03 Nov 2021 07:01  -  04 Nov 2021 07:00  --------------------------------------------------------  IN: 520 mL / OUT: 750 mL / NET: -230 mL    LABS:                        10.2   8.15  )-----------( 273      ( 04 Nov 2021 06:24 )             32.6     11-03    141  |  98  |  44<H>  ----------------------------<  114<H>  3.2<L>   |  22  |  2.9<H>    Ca    8.8      03 Nov 2021 11:24  Phos  4.8     11-02  Mg     1.7     11-03    TPro  7.1  /  Alb  4.2  /  TBili  0.9  /  DBili  x   /  AST  56<H>  /  ALT  106<H>  /  AlkPhos  112  11-03    PT/INR - ( 03 Nov 2021 11:24 )   PT: 17.00 sec;   INR: 1.48 ratio      MEDICATIONS  (STANDING):  amLODIPine   Tablet 5 milliGRAM(s) Oral at bedtime  aspirin enteric coated 81 milliGRAM(s) Oral daily  atorvastatin 80 milliGRAM(s) Oral at bedtime  chlorhexidine 4% Liquid 1 Application(s) Topical daily  cholecalciferol 1000 Unit(s) Oral daily  dextrose 40% Gel 15 Gram(s) Oral once  dextrose 5%. 1000 milliLiter(s) (50 mL/Hr) IV Continuous <Continuous>  dextrose 5%. 1000 milliLiter(s) (100 mL/Hr) IV Continuous <Continuous>  dextrose 50% Injectable 25 Gram(s) IV Push once  dextrose 50% Injectable 12.5 Gram(s) IV Push once  dextrose 50% Injectable 25 Gram(s) IV Push once  glucagon  Injectable 1 milliGRAM(s) IntraMuscular once  heparin SubCutaneous Injection - Peds 5000 Unit(s) SubCutaneous every 8 hours  insulin lispro (ADMELOG) corrective regimen sliding scale   SubCutaneous three times a day before meals  melatonin 3 milliGRAM(s) Oral at bedtime  metoprolol tartrate 50 milliGRAM(s) Oral two times a day  pantoprazole    Tablet 40 milliGRAM(s) Oral before breakfast  tamsulosin 0.4 milliGRAM(s) Oral at bedtime  valsartan 320 milliGRAM(s) Oral daily    IMPRESSION:  1) Severe Aortic Stenosis    2) CAD s/p CABG    3) Chronic Diastolic CHF:  - Relatively compensated (improved volume overload)    RECOMMEND:  Bedside discussion with patient / wife.  Also discussed with Dr. Grewal.  Severe symptomatic AS.  Malignancy seems to be in remission for prolonged period.  Complicating factor for TAVR is CKD with risk of FAVIO / ELVIA (possibly permanent) with required contrast studies (CTA, Cath, TAVR).  This was disclosed Will try to obtain coronary / graft imaging with CTA in attempt top possible avoid cath if imaging adequate and no critical disease.  Dr. Jessica to perform CTA today.    - Hold morning Lasix.  - Extra 50mg Metoprolol last night and this morning for CTA (optimization).  - Further TAVR planning pending CTA.    Discussed with cardiology fellow and Dr. Grewal last night.
Surgeon:     Consult requesting by: Dr. Chaney  PMD/Cards: Dr. Grewal      HISTORY OF PRESENT ILLNESS: 69 yo male, with PMH of CAD s/p CABG, HTN, DM II , DL, colon cancer sp colectomy with liver metastasis s/p resection and lung metastasis s/p chemo, progressively worsening severe AS, CKD IV, and BPH presented for worsening SOB/HERNANDEZ.  Patient has critical AS , delayed TAVR due to recent pneumonia and declining kidney function. CTS consulted for possible TAVR.    NYHA functional class    [ ] Class I (no limitation) [ ] Class II (slight limitation) [x ] Class III (marked limitation) [ ] Class IV (symptoms at rest)    PAST MEDICAL & SURGICAL HISTORY:  Colon cancer w/liver and lung metastasis  3-vessel CAD: sp CABG  DMII (diabetes mellitus, type 2)  Hypertension  Dyslipidemia  Hepatitis C: treated INF  History of coronary artery bypass graft  History of appendectomy  History of resection of liver  H/O colectomy  S/P cholecystectomy      MEDICATIONS  (STANDING):  amLODIPine   Tablet 5 milliGRAM(s) Oral at bedtime  aspirin enteric coated 81 milliGRAM(s) Oral daily  atorvastatin 80 milliGRAM(s) Oral at bedtime  chlorhexidine 4% Liquid 1 Application(s) Topical daily  cholecalciferol 1000 Unit(s) Oral daily  dextrose 40% Gel 15 Gram(s) Oral once  dextrose 5%. 1000 milliLiter(s) (50 mL/Hr) IV Continuous <Continuous>  dextrose 5%. 1000 milliLiter(s) (100 mL/Hr) IV Continuous <Continuous>  dextrose 50% Injectable 25 Gram(s) IV Push once  dextrose 50% Injectable 12.5 Gram(s) IV Push once  dextrose 50% Injectable 25 Gram(s) IV Push once  furosemide   Injectable 40 milliGRAM(s) IV Push two times a day  glucagon  Injectable 1 milliGRAM(s) IntraMuscular once  heparin SubCutaneous Injection - Peds 5000 Unit(s) SubCutaneous every 8 hours  insulin lispro (ADMELOG) corrective regimen sliding scale   SubCutaneous three times a day before meals  magnesium sulfate  IVPB 2 Gram(s) IV Intermittent every 2 hours  melatonin 3 milliGRAM(s) Oral at bedtime  metoprolol tartrate 50 milliGRAM(s) Oral two times a day  pantoprazole    Tablet 40 milliGRAM(s) Oral before breakfast  tamsulosin 0.4 milliGRAM(s) Oral at bedtime  valsartan 320 milliGRAM(s) Oral daily    MEDICATIONS  (PRN):      Home Medications:  aspirin 81 mg oral tablet:  (17 Sep 2021 11:27)  bumetanide 2 mg oral tablet: 1 tab(s) orally once a day (17 Sep 2021 11:27)  cyanocobalamin:  (17 Sep 2021 11:27)  Diovan 160 mg oral tablet: 1 tab(s) orally once a day (17 Sep 2021 11:29)  escitalopram 10 mg oral tablet: 1 tab(s) orally once a day (17 Sep 2021 11:27)  Metoprolol Tartrate 50 mg oral tablet: 1 tab(s) orally 2 times a day (17 Sep 2021 11:29)  pantoprazole 40 mg oral delayed release tablet: 1 tab(s) orally once a day (17 Sep 2021 11:27)  rosuvastatin 20 mg oral tablet: 1 tab(s) orally once a day (17 Sep 2021 11:27)  tamsulosin 0.4 mg oral capsule: 1 cap(s) orally once a day (17 Sep 2021 11:27)  Vitamin D3 25 mcg (1000 intl units) oral tablet: 1 tab(s) orally once a day (17 Sep 2021 11:27)      Allergies    No Known Allergies    Intolerances      Review of Systems  CONSTITUTIONAL:  Fevers[ ] chills[ ] sweats[ ] fatigue[ ] weight loss[ ] weight gain [ ]                                     NEGATIVE [X ]   NEURO:  paresthesias[ ] seizures [ ]  syncope [ ]  confusion [ ]                                                                                NEGATIVE[ X]   EYES: glasses[ ]  blurry vision[ ]  discharge[ ] pain[ ] glaucoma [ ]                                                                          NEGATIVE[X ]   ENMT:  difficulty hearing [ ]  vertigo[ ]  dysphagia[ ] epistaxis[ ] recent dental work [ ]                                    NEGATIVE[ X]   CV:  chest pain[ ] palpitations[ ] HERNNADEZ [ ] diaphoresis [ ]                                                                                           NEGATIVE[ X]   RESPIRATORY:  wheezing[ ] SOB[ ] cough [ ] sputum[ ] hemoptysis[ ]                                                                  NEGATIVE[ ]   GI:  nausea[ ]  vomiting [ ]  diarrhea[ ] constipation [ ] melena [ ]                                                                         NEGATIVE[ X]   : hematuria[ ]  dysuria[ ] urgency[ ] incontinence[ ]                                                                                            NEGATIVE[ X]   MUSKULOSKELETAL:  arthritis[ ]  joint swelling [ ] muscle weakness [ ] Hx vein stripping [ ]                             NEGATIVE[X ]   SKIN/BREAST:  rash[ ] itching [ ]  hair loss[ ] masses[ ]                                                                                              NEGATIVE[ X]   PSYCH:  dementia [ ] depression [ ] anxiety[ ]                                                                                                               NEGATIVE[X ]   HEME/LYMPH:  bruises easily[ ] enlarged lymph nodes[ ] tender lymph nodes[ ]                                               NEGATIVE[ X]   ENDOCRINE:  cold intolerance[ ] heat intolerance[ ] polydipsia[ ]                                                                          NEGATIVE[ X]     PHYSICAL EXAM  Vital Signs Last 24 Hrs  T(C): 36.2 (04 Nov 2021 14:26), Max: 36.2 (04 Nov 2021 04:37)  T(F): 97.1 (04 Nov 2021 14:26), Max: 97.1 (04 Nov 2021 04:37)  HR: 64 (04 Nov 2021 14:26) (59 - 67)  BP: 123/69 (04 Nov 2021 14:26) (123/69 - 143/68)  RR: 18 (04 Nov 2021 14:26) (18 - 18)  SpO2: 98% (04 Nov 2021 14:26) (98% - 98%)    CONSTITUTIONAL:  WNL[x ]   Neuro: WNL [x ] Normal exam oriented to person/place & time with no focal motor or sensory  deficits. Other                     Eyes:    WNL [x ] Normal exam of conjunctiva & lids, pupils equally reactive. Other     ENT:     WNL [x ] Normal exam of nasal/oral mucosa with absence of cyanosis. Other  Neck:   WNL [ x] Normal exam of jugular veins, trachea & thyroid. Other  Chest:  WNL [x ] Normal lung exam with good air movement absence of wheezes, rales, or rhonchi: Other                                                                                CV:  Auscultation: normal [x ] S3[ ] S4[ ] Irregular [ ] Rub[ ] Clicks[ ]    Murmurs none:[ ]systolic [x ]  diastolic [ ] holosystolic [ ]  Carotids: No Bruits[x ] Other                  Abdominal Aorta: normal [x ] nonpalpable[ ]Other                                                                                      GI: WNL[x ] Normal exam of abdomen, liver & spleen with no noted masses or tenderness. Other                                                                                                        Extremities: WNL[ x] Normal no evidence of cyanosis or deformity Edema: none[ ]trace[ ]1+[ ]2+[ ]3+[ ]4+[ ]  Lower Extremity Pulses: Right[x ] Left[x ]Varicosities[ ]  SKIN :WNL[x ] Normal exam to inspection & palpation. Other:                                                          LABS:                        10.2   8.15  )-----------( 273      ( 04 Nov 2021 06:24 )             32.6     11-04    143  |  100  |  42<H>  ----------------------------<  115<H>  3.4<L>   |  24  |  3.1<H>    Ca    8.7      04 Nov 2021 06:24  Mg     1.6     11-04    TPro  6.9  /  Alb  4.1  /  TBili  1.3<H>  /  DBili  x   /  AST  49<H>  /  ALT  88<H>  /  AlkPhos  108  11-04    PT/INR - ( 03 Nov 2021 11:24 )   PT: 17.00 sec;   INR: 1.48 ratio      COVID Result: COVID-19 PCR: NotDetec (11-01-21 @ 12:16)  COVID-19 PCR: NotDetec (09-14-21 @ 17:24)    TTE: < from: TTE Echo Complete w/o Contrast w/ Doppler (11.03.21 @ 13:14) >  Summary:   1. Normal global left ventricular systolic function.   2. LV Ejection Fraction by Haji's Method with a biplane EF of 57 %.   3. Spectral Doppler shows pseudonormal pattern of left ventricular myocardial filling (Grade II diastolic dysfunction).   4. Mildly enlarged right ventricle.   5. Mildly reduced RV systolic function.   6. Mildly enlarged left atrium.   7. Mildly enlarged right atrium.   8. Mild mitral valve regurgitation.   9. Mild thickening of the anterior and posterior mitral valve leaflets.  10. Moderate mitral annular calcification.  11. Moderate tricuspid regurgitation.  12. Mild aortic regurgitation.  13. Probably bicuspid AV is heavily calcified.  14. Estimated pulmonary artery systolic pressure is 63.6 mmHg assuming a right atrial pressure of 8 mmHg, whichis consistent with severe pulmonary hypertension.  15. Mildly dilated pulmonary artery.  16. Peak transaortic gradient equals 80.6 mmHg, mean transaortic gradient equals 48.1 mmHg, the calculated aortic valve area equals 0.55 cm² by the continuity equation consistent with severe aortic stenosis.    PHYSICIAN INTERPRETATION:  Left Ventricle: The left ventricular internal cavity size is normal. Left ventricular wall thickness is normal. Global LV systolic function was normal. Spectral Doppler showspseudonormal pattern of left ventricular myocardial filling (Grade II diastolic dysfunction).  Right Ventricle: The right ventricular size is mildly enlarged. RV systolic function is mildly reduced.  Left Atrium: Mildly enlarged left atrium.  Right Atrium: Mildly enlarged right atrium.  Pericardium: There is no evidence of pericardial effusion.  Mitral Valve: Mild thickening of the anterior and posterior mitral valve leaflets. There is moderate mitral annular calcification. Mild mitral valve regurgitation is seen.  Tricuspid Valve: The tricuspid valve is normal in structure. Moderate tricuspid regurgitation is visualized. Estimated pulmonary artery systolic pressure is 63.6 mmHg assuming a right atrial pressure of 8 mmHg, which is consistent with severe pulmonary hypertension.  Aortic Valve: Peak transaortic gradient equals 80.6 mmHg, mean transaortic gradient equals 48.1 mmHg, the calculated aortic valve area equals 0.55 cm² by the continuity equation consistent with severe aortic stenosis. Mild aortic valve regurgitation is seen. Probably bicuspid AV is heavily calcified.  Pulmonic Valve: Structurally normal pulmonic valve, with normal leaflet excursion. No indication of pulmonic valve regurgitation.  Aorta: The aortic root is normal in size and structure.  Pulmonary Artery: The pulmonary artery is mildly dilated.    < end of copied text >     ct chest TAVR protocol: pending report.      Impression:  CAD [x ]  Valvular  disease [x ]   Aortic Disease [ ]   ELVIA: Yes[ ] No [ ]   CKD Stage I [ ] , Stage II [ ] , Stage III [x ], Stage IV [ ]   Anemia: Yes [ ], No [ ]  Diabetes :Yes [x ], No [ ]  Acute MI: Yes [ ], No [ ]   Heart Failure: Yes [ ] , No [ ] HFpEF [ ], HFrEF [x ]      Assessment/ Plan: 69 yo male, with PMH of CAD s/p CABG, HTN, DM II , DL, colon cancer sp colectomy with liver metastasis s/p resection and lung metastasis s/p chemo, progressively worsening severe AS, CKD IV, and BPH presented for worsening SOB/HERNANDEZ.  Patient has critical AS , delayed TAVR due to recent pneumonia and declining kidney function. CTS consulted for possible TAVR.  -Case and plan discussed with CT surgeon . Initial STS risk assessed and discussed with patient. Evaluation by full heart team pending. Attending note to follow.

## 2021-11-05 ENCOUNTER — TRANSCRIPTION ENCOUNTER (OUTPATIENT)
Age: 71
End: 2021-11-05

## 2021-11-05 VITALS
TEMPERATURE: 98 F | HEART RATE: 73 BPM | SYSTOLIC BLOOD PRESSURE: 139 MMHG | RESPIRATION RATE: 18 BRPM | DIASTOLIC BLOOD PRESSURE: 71 MMHG | WEIGHT: 181 LBS

## 2021-11-05 LAB
ALBUMIN SERPL ELPH-MCNC: 4 G/DL — SIGNIFICANT CHANGE UP (ref 3.5–5.2)
ALP SERPL-CCNC: 104 U/L — SIGNIFICANT CHANGE UP (ref 30–115)
ALT FLD-CCNC: 78 U/L — HIGH (ref 0–41)
ANION GAP SERPL CALC-SCNC: 18 MMOL/L — HIGH (ref 7–14)
AST SERPL-CCNC: 47 U/L — HIGH (ref 0–41)
BASOPHILS # BLD AUTO: 0.04 K/UL — SIGNIFICANT CHANGE UP (ref 0–0.2)
BASOPHILS NFR BLD AUTO: 0.6 % — SIGNIFICANT CHANGE UP (ref 0–1)
BILIRUB SERPL-MCNC: 0.6 MG/DL — SIGNIFICANT CHANGE UP (ref 0.2–1.2)
BUN SERPL-MCNC: 34 MG/DL — HIGH (ref 10–20)
CALCIUM SERPL-MCNC: 8.6 MG/DL — SIGNIFICANT CHANGE UP (ref 8.5–10.1)
CHLORIDE SERPL-SCNC: 100 MMOL/L — SIGNIFICANT CHANGE UP (ref 98–110)
CO2 SERPL-SCNC: 22 MMOL/L — SIGNIFICANT CHANGE UP (ref 17–32)
CREAT SERPL-MCNC: 2.8 MG/DL — HIGH (ref 0.7–1.5)
EOSINOPHIL # BLD AUTO: 0.27 K/UL — SIGNIFICANT CHANGE UP (ref 0–0.7)
EOSINOPHIL NFR BLD AUTO: 4.4 % — SIGNIFICANT CHANGE UP (ref 0–8)
GLUCOSE BLDC GLUCOMTR-MCNC: 160 MG/DL — HIGH (ref 70–99)
GLUCOSE BLDC GLUCOMTR-MCNC: 183 MG/DL — HIGH (ref 70–99)
GLUCOSE SERPL-MCNC: 137 MG/DL — HIGH (ref 70–99)
HCT VFR BLD CALC: 32.6 % — LOW (ref 42–52)
HGB BLD-MCNC: 10.1 G/DL — LOW (ref 14–18)
IMM GRANULOCYTES NFR BLD AUTO: 0.3 % — SIGNIFICANT CHANGE UP (ref 0.1–0.3)
LYMPHOCYTES # BLD AUTO: 0.99 K/UL — LOW (ref 1.2–3.4)
LYMPHOCYTES # BLD AUTO: 16 % — LOW (ref 20.5–51.1)
MAGNESIUM SERPL-MCNC: 2.3 MG/DL — SIGNIFICANT CHANGE UP (ref 1.8–2.4)
MCHC RBC-ENTMCNC: 26.6 PG — LOW (ref 27–31)
MCHC RBC-ENTMCNC: 31 G/DL — LOW (ref 32–37)
MCV RBC AUTO: 86 FL — SIGNIFICANT CHANGE UP (ref 80–94)
MONOCYTES # BLD AUTO: 1.05 K/UL — HIGH (ref 0.1–0.6)
MONOCYTES NFR BLD AUTO: 17 % — HIGH (ref 1.7–9.3)
NEUTROPHILS # BLD AUTO: 3.82 K/UL — SIGNIFICANT CHANGE UP (ref 1.4–6.5)
NEUTROPHILS NFR BLD AUTO: 61.7 % — SIGNIFICANT CHANGE UP (ref 42.2–75.2)
NRBC # BLD: 0 /100 WBCS — SIGNIFICANT CHANGE UP (ref 0–0)
PLATELET # BLD AUTO: 251 K/UL — SIGNIFICANT CHANGE UP (ref 130–400)
POTASSIUM SERPL-MCNC: 3.9 MMOL/L — SIGNIFICANT CHANGE UP (ref 3.5–5)
POTASSIUM SERPL-SCNC: 3.9 MMOL/L — SIGNIFICANT CHANGE UP (ref 3.5–5)
PROT SERPL-MCNC: 6.7 G/DL — SIGNIFICANT CHANGE UP (ref 6–8)
RBC # BLD: 3.79 M/UL — LOW (ref 4.7–6.1)
RBC # FLD: 15.6 % — HIGH (ref 11.5–14.5)
SODIUM SERPL-SCNC: 140 MMOL/L — SIGNIFICANT CHANGE UP (ref 135–146)
WBC # BLD: 6.19 K/UL — SIGNIFICANT CHANGE UP (ref 4.8–10.8)
WBC # FLD AUTO: 6.19 K/UL — SIGNIFICANT CHANGE UP (ref 4.8–10.8)

## 2021-11-05 PROCEDURE — 99221 1ST HOSP IP/OBS SF/LOW 40: CPT

## 2021-11-05 RX ORDER — BUMETANIDE 0.25 MG/ML
1 INJECTION INTRAMUSCULAR; INTRAVENOUS
Qty: 0 | Refills: 0 | DISCHARGE

## 2021-11-05 RX ORDER — FUROSEMIDE 40 MG
1 TABLET ORAL
Qty: 0 | Refills: 0 | DISCHARGE
Start: 2021-11-05

## 2021-11-05 RX ORDER — FUROSEMIDE 40 MG
1 TABLET ORAL
Qty: 30 | Refills: 0
Start: 2021-11-05 | End: 2021-12-04

## 2021-11-05 RX ORDER — AMLODIPINE BESYLATE 2.5 MG/1
1 TABLET ORAL
Qty: 30 | Refills: 0
Start: 2021-11-05 | End: 2021-12-04

## 2021-11-05 RX ADMIN — HEPARIN SODIUM 5000 UNIT(S): 5000 INJECTION INTRAVENOUS; SUBCUTANEOUS at 06:16

## 2021-11-05 RX ADMIN — Medication 1000 UNIT(S): at 12:18

## 2021-11-05 RX ADMIN — VALSARTAN 320 MILLIGRAM(S): 80 TABLET ORAL at 06:16

## 2021-11-05 RX ADMIN — Medication 1: at 12:18

## 2021-11-05 RX ADMIN — Medication 81 MILLIGRAM(S): at 12:18

## 2021-11-05 RX ADMIN — Medication 40 MILLIGRAM(S): at 06:15

## 2021-11-05 RX ADMIN — Medication 50 MILLIGRAM(S): at 06:16

## 2021-11-05 RX ADMIN — PANTOPRAZOLE SODIUM 40 MILLIGRAM(S): 20 TABLET, DELAYED RELEASE ORAL at 06:16

## 2021-11-05 RX ADMIN — HEPARIN SODIUM 5000 UNIT(S): 5000 INJECTION INTRAVENOUS; SUBCUTANEOUS at 12:19

## 2021-11-05 NOTE — DISCHARGE NOTE PROVIDER - PROVIDER TOKENS
PROVIDER:[TOKEN:[[id number]],SCHEDULEDAPPT:[11/09/2021],SCHEDULEDAPPTTIME:[06:00 PM]],PROVIDER:[TOKEN:[[id number]],FOLLOWUP:[1 week]],PROVIDER:[TOKEN:[[id number]],FOLLOWUP:[2 weeks]]

## 2021-11-05 NOTE — DISCHARGE NOTE PROVIDER - CARE PROVIDERS DIRECT ADDRESSES
,DirectAddress_Unknown,DirectAddress_Unknown,[email or web address]

## 2021-11-05 NOTE — DISCHARGE NOTE PROVIDER - NSDCCPCAREPLAN_GEN_ALL_CORE_FT
PRINCIPAL DISCHARGE DIAGNOSIS  Diagnosis: Severe aortic stenosis  Assessment and Plan of Treatment: You have severe tightening of your aortic valve. You will benefit from a replacement of the valve. The risks and benefits were discussed with you by the specialists Dr. Ortiz and Dr. Chaney. Be sure to follow up with Dr. Grewal on Tuesday at 6 PM to follow up on the plan regarding your valve replacement. Be sure to take Lasix 40 mg every day.      SECONDARY DISCHARGE DIAGNOSES  Diagnosis: HTN (hypertension)  Assessment and Plan of Treatment: You were having elevated blood pressures and Amlodipine was added to your regimen, in addition to Valsartan. Continue taking as prescribed.

## 2021-11-05 NOTE — DISCHARGE NOTE PROVIDER - HOSPITAL COURSE
69 yo male, with PMH of CAD s/p CABG, HTN, DM II , DL, colon cancer sp colectomy with lung metastasis and liver metastasis s/p resection, severe AS, CKD IV, and BPH presented for SOB  Patient has critical AS , delayed TAVR due to recent pneumonia and decline kidney function. History goes back to two weeks ago, when patient started to feel short of breath on exertion with worsening symptoms, dry cough, orthopnea and PND. Patient also reports worsening leg swelling. Pt was initiated on IV diuresis, improved and transitioned to PO. HF exacerbation, pulmonary edema contributed to by severe aortic valve stenosis and pt will benefit from TAVR. however, given CKD, other underlying comorbidities, pt is high risk. CT Sx, Dr. Chaney and Dr. Grewal discussed risks and benefits with pt. Pt wishes to be discharged home, decide with his family. To follow up with Dr. Grewal on Tuesday.

## 2021-11-05 NOTE — PROGRESS NOTE ADULT - ASSESSMENT
71 yo M with PMHx of CAD s/p CABG, HTN, prediabetes, DLD, Colon Ca s/p colectomy w lung mets, liver mets s/p resection, severe AS, CKD IV, BPH, hx of Hep C s/p tx presents with dyspnea.    #Acute CHF Exacerbation   #Severe symptomatic AS  #CAD s/p CABG  - s/p diuresis with IV Lasix with improvement of dyspnea, now on IV Lasix 40 mg once daily   - TTE EF 57%, BUT this is NOT accurate, His LVEF is around 40% to 45%.  - Structural cardiology, Dr. Chaney consulted, CCTA and CT Abd performed  - Pt candidate for TAVR as his colon Ca has been stable; CT Sx following, to discuss plan with pt today    #HTN  - C/w Amlodipine, Valsartan    #Prediabetes  - HbA1c 6.3 in September  - C/w SS, escalate as needed    #CKD IV  - Baseline Cr ~ 2.5, now 3  - Cont to monitor, avoid nephrotoxic agents    #Transaminitis  - Trending down, may be related to congestive hepatopathy   - RUQ US revealed no abnormalities    #Colon Ca s/p Colectomy with lung mets and liver mets s/p resection, stable mets x 5 years  #Hx of Hep C s/p tx   #BPH - C/w Tamsulosin    DVT ppx: HSQ  GI ppx: PPI  Diet: DASH, CC, fluid restriction  FUll Code  Dispo: Pending CT Sx plan

## 2021-11-05 NOTE — DISCHARGE NOTE PROVIDER - NSDCMRMEDTOKEN_GEN_ALL_CORE_FT
amLODIPine 5 mg oral tablet: 1 tab(s) orally once a day (at bedtime)  aspirin 81 mg oral tablet:   cyanocobalamin:   Diovan 160 mg oral tablet: 1 tab(s) orally once a day  escitalopram 10 mg oral tablet: 1 tab(s) orally once a day  furosemide 40 mg oral tablet: 1 tab(s) orally once a day  Metoprolol Tartrate 50 mg oral tablet: 1 tab(s) orally 2 times a day  pantoprazole 40 mg oral delayed release tablet: 1 tab(s) orally once a day  rosuvastatin 20 mg oral tablet: 1 tab(s) orally once a day  tamsulosin 0.4 mg oral capsule: 1 cap(s) orally once a day  Vitamin D3 25 mcg (1000 intl units) oral tablet: 1 tab(s) orally once a day

## 2021-11-05 NOTE — PROGRESS NOTE ADULT - SUBJECTIVE AND OBJECTIVE BOX
Patient Information:  MAAME IGLESIAS / 70y / Male / MRN#:[id number]    Hospital Day: 4d    Interval History:  Patient seen and examined at bedside. No acute events overnight. Pt reports he feels well and wants to go home today no matter what.    Past Medical History:  Liver cancer    Rectal bleed    Colon cancer    3-vessel CAD    DMII (diabetes mellitus, type 2)    Hypertension    Dyslipidemia    Hepatitis C      Past Surgical History:  History of coronary artery bypass graft    History of appendectomy    History of resection of liver    H/O colectomy    S/P cholecystectomy      Allergies:  No Known Allergies    Medications:  PRN:    Standing:  amLODIPine   Tablet 5 milliGRAM(s) Oral at bedtime  aspirin enteric coated 81 milliGRAM(s) Oral daily  atorvastatin 80 milliGRAM(s) Oral at bedtime  chlorhexidine 4% Liquid 1 Application(s) Topical daily  cholecalciferol 1000 Unit(s) Oral daily  dextrose 40% Gel 15 Gram(s) Oral once  dextrose 5%. 1000 milliLiter(s) (50 mL/Hr) IV Continuous <Continuous>  dextrose 5%. 1000 milliLiter(s) (100 mL/Hr) IV Continuous <Continuous>  dextrose 50% Injectable 25 Gram(s) IV Push once  dextrose 50% Injectable 12.5 Gram(s) IV Push once  dextrose 50% Injectable 25 Gram(s) IV Push once  furosemide   Injectable 40 milliGRAM(s) IV Push daily  glucagon  Injectable 1 milliGRAM(s) IntraMuscular once  heparin SubCutaneous Injection - Peds 5000 Unit(s) SubCutaneous every 8 hours  insulin lispro (ADMELOG) corrective regimen sliding scale   SubCutaneous three times a day before meals  melatonin 3 milliGRAM(s) Oral at bedtime  metoprolol tartrate 50 milliGRAM(s) Oral two times a day  pantoprazole    Tablet 40 milliGRAM(s) Oral before breakfast  tamsulosin 0.4 milliGRAM(s) Oral at bedtime  valsartan 320 milliGRAM(s) Oral daily    Home:  aspirin 81 mg oral tablet:   bumetanide 2 mg oral tablet: 1 tab(s) orally once a day  cyanocobalamin:   Diovan 160 mg oral tablet: 1 tab(s) orally once a day  escitalopram 10 mg oral tablet: 1 tab(s) orally once a day  Metoprolol Tartrate 50 mg oral tablet: 1 tab(s) orally 2 times a day  pantoprazole 40 mg oral delayed release tablet: 1 tab(s) orally once a day  rosuvastatin 20 mg oral tablet: 1 tab(s) orally once a day  tamsulosin 0.4 mg oral capsule: 1 cap(s) orally once a day  Vitamin D3 25 mcg (1000 intl units) oral tablet: 1 tab(s) orally once a day    Vitals:  T(C): 36.9, Max: 36.9 (11-05-21 @ 04:51)  T(F): 98.4, Max: 98.4 (11-05-21 @ 04:51)  HR: 73 (60 - 73)  BP: 139/71 (119/67 - 139/71)  RR: 18 (18 - 18)  SpO2: 98% (98% - 98%)    Physical Exam:  General: Awake, alert, NAD, resting comfortably in bed, on RA  HEENT: Head NC/AT  Heart: RRR; S1/S2; no rubs, murmurs appreciated  Lungs: Clear to auscultation bilaterally, no wheezing, rales or rhonchi  Abdomen: Soft, nontender, nondistended, BS+  Extremities: No edema, clubbing, cyanosis in upper or lower extremities.  Neuro: AAOx3, NFD.    Labs:  CBC (11-05 @ 05:02)                        Hgb: 10.1   WBC: 6.19  )-----------------( Plts: 251                              Hct: 32.6     Chem (11-05 @ 05:02)  Na: 140  |     Cl: 100     |  BUN: 34  -----------------------------------------< Gluc: 137    K: 3.9   |    HCO3: 22  |  Cr: 2.8    Ca 8.6 (11-05 @ 05:02)  Mg 2.3 (11-05 @ 05:02)    LFTs (11-05 @ 05:02)  TPro 6.7  /  Alb 4.0  TBili 0.6  /  DBili     AST 47  /  ALT 78  /  AlkPhos 104    Cardiac Markers (11-02 @ 07:55)  Troponin I X  Troponin T 0.06  CK X  CKMB X  CKMB Units X  Myoglobin X  Lactate X  ESR X    Cardiac Markers (11-01 @ 11:20)  Troponin I X  Troponin T 0.06  CK X  CKMB X  CKMB Units X  Myoglobin X  Lactate X  ESR X        PT/INR (11-03 @ 11:24)  PT: 17.00; INR: 1.48   PTT:                  Microbiology:    Radiology:     Patient Information:  MAAME IGLESIAS / 70y / Male / MRN#:[id number]    Hospital Day: 4d    Interval History:  Patient seen and examined at bedside. No acute events overnight. Pt reports he feels well and wants to go home today no matter what.    Past Medical History:  Liver cancer    Rectal bleed    Colon cancer    3-vessel CAD    DMII (diabetes mellitus, type 2)    Hypertension    Dyslipidemia    Hepatitis C      Past Surgical History:  History of coronary artery bypass graft    History of appendectomy    History of resection of liver    H/O colectomy    S/P cholecystectomy      Allergies:  No Known Allergies    Medications:  PRN:    Standing:  amLODIPine   Tablet 5 milliGRAM(s) Oral at bedtime  aspirin enteric coated 81 milliGRAM(s) Oral daily  atorvastatin 80 milliGRAM(s) Oral at bedtime  chlorhexidine 4% Liquid 1 Application(s) Topical daily  cholecalciferol 1000 Unit(s) Oral daily  dextrose 40% Gel 15 Gram(s) Oral once  dextrose 5%. 1000 milliLiter(s) (50 mL/Hr) IV Continuous <Continuous>  dextrose 5%. 1000 milliLiter(s) (100 mL/Hr) IV Continuous <Continuous>  dextrose 50% Injectable 25 Gram(s) IV Push once  dextrose 50% Injectable 12.5 Gram(s) IV Push once  dextrose 50% Injectable 25 Gram(s) IV Push once  furosemide   Injectable 40 milliGRAM(s) IV Push daily  glucagon  Injectable 1 milliGRAM(s) IntraMuscular once  heparin SubCutaneous Injection - Peds 5000 Unit(s) SubCutaneous every 8 hours  insulin lispro (ADMELOG) corrective regimen sliding scale   SubCutaneous three times a day before meals  melatonin 3 milliGRAM(s) Oral at bedtime  metoprolol tartrate 50 milliGRAM(s) Oral two times a day  pantoprazole    Tablet 40 milliGRAM(s) Oral before breakfast  tamsulosin 0.4 milliGRAM(s) Oral at bedtime  valsartan 320 milliGRAM(s) Oral daily    Home:  aspirin 81 mg oral tablet:   bumetanide 2 mg oral tablet: 1 tab(s) orally once a day  cyanocobalamin:   Diovan 160 mg oral tablet: 1 tab(s) orally once a day  escitalopram 10 mg oral tablet: 1 tab(s) orally once a day  Metoprolol Tartrate 50 mg oral tablet: 1 tab(s) orally 2 times a day  pantoprazole 40 mg oral delayed release tablet: 1 tab(s) orally once a day  rosuvastatin 20 mg oral tablet: 1 tab(s) orally once a day  tamsulosin 0.4 mg oral capsule: 1 cap(s) orally once a day  Vitamin D3 25 mcg (1000 intl units) oral tablet: 1 tab(s) orally once a day    Vitals:  T(C): 36.9, Max: 36.9 (11-05-21 @ 04:51)  T(F): 98.4, Max: 98.4 (11-05-21 @ 04:51)  HR: 73 (60 - 73)  BP: 139/71 (119/67 - 139/71)  RR: 18 (18 - 18)  SpO2: 98% (98% - 98%)    Physical Exam:  General: Awake, alert, NAD, sitting up in chair, on RA  HEENT: Head NC/AT  Heart: RRR; S1/S2; no rubs, murmurs appreciated  Lungs: Clear to auscultation bilaterally, no wheezing, rales or rhonchi  Abdomen: Soft, nontender, nondistended, BS+  Extremities: No edema, clubbing, cyanosis in upper or lower extremities  Neuro: AAOx3, NFD    Labs:  CBC (11-05 @ 05:02)                        Hgb: 10.1   WBC: 6.19  )-----------------( Plts: 251                              Hct: 32.6     Chem (11-05 @ 05:02)  Na: 140  |     Cl: 100     |  BUN: 34  -----------------------------------------< Gluc: 137    K: 3.9   |    HCO3: 22  |  Cr: 2.8    Ca 8.6 (11-05 @ 05:02)  Mg 2.3 (11-05 @ 05:02)    LFTs (11-05 @ 05:02)  TPro 6.7  /  Alb 4.0  TBili 0.6  /  DBili     AST 47  /  ALT 78  /  AlkPhos 104    Cardiac Markers (11-02 @ 07:55)  Troponin I X  Troponin T 0.06  CK X  CKMB X  CKMB Units X  Myoglobin X  Lactate X  ESR X    Cardiac Markers (11-01 @ 11:20)  Troponin I X  Troponin T 0.06  CK X  CKMB X  CKMB Units X  Myoglobin X  Lactate X  ESR X        PT/INR (11-03 @ 11:24)  PT: 17.00; INR: 1.48   PTT:

## 2021-11-05 NOTE — DISCHARGE NOTE NURSING/CASE MANAGEMENT/SOCIAL WORK - PATIENT PORTAL LINK FT
You can access the FollowMyHealth Patient Portal offered by [facility] by registering at the following website: [email or web address]. By joining [employer]’s FollowMyHealth portal, you will also be able to view your health information using other applications (apps) compatible with our system.

## 2021-11-10 DIAGNOSIS — E11.22 TYPE 2 DIABETES MELLITUS WITH DIABETIC CHRONIC KIDNEY DISEASE: ICD-10-CM

## 2021-11-10 DIAGNOSIS — F41.9 ANXIETY DISORDER, UNSPECIFIED: ICD-10-CM

## 2021-11-10 DIAGNOSIS — I13.0 HYPERTENSIVE HEART AND CHRONIC KIDNEY DISEASE WITH HEART FAILURE AND STAGE 1 THROUGH STAGE 4 CHRONIC KIDNEY DISEASE, OR UNSPECIFIED CHRONIC KIDNEY DISEASE: ICD-10-CM

## 2021-11-10 DIAGNOSIS — K21.9 GASTRO-ESOPHAGEAL REFLUX DISEASE WITHOUT ESOPHAGITIS: ICD-10-CM

## 2021-11-10 DIAGNOSIS — F32.9 MAJOR DEPRESSIVE DISORDER, SINGLE EPISODE, UNSPECIFIED: ICD-10-CM

## 2021-11-10 DIAGNOSIS — E78.5 HYPERLIPIDEMIA, UNSPECIFIED: ICD-10-CM

## 2021-11-10 DIAGNOSIS — I25.10 ATHEROSCLEROTIC HEART DISEASE OF NATIVE CORONARY ARTERY WITHOUT ANGINA PECTORIS: ICD-10-CM

## 2021-11-10 DIAGNOSIS — Z85.05 PERSONAL HISTORY OF MALIGNANT NEOPLASM OF LIVER: ICD-10-CM

## 2021-11-10 DIAGNOSIS — N18.4 CHRONIC KIDNEY DISEASE, STAGE 4 (SEVERE): ICD-10-CM

## 2021-11-10 DIAGNOSIS — Z95.1 PRESENCE OF AORTOCORONARY BYPASS GRAFT: ICD-10-CM

## 2021-11-10 DIAGNOSIS — R94.31 ABNORMAL ELECTROCARDIOGRAM [ECG] [EKG]: ICD-10-CM

## 2021-11-10 DIAGNOSIS — Z85.118 PERSONAL HISTORY OF OTHER MALIGNANT NEOPLASM OF BRONCHUS AND LUNG: ICD-10-CM

## 2021-11-10 DIAGNOSIS — I50.33 ACUTE ON CHRONIC DIASTOLIC (CONGESTIVE) HEART FAILURE: ICD-10-CM

## 2021-11-10 DIAGNOSIS — Z86.19 PERSONAL HISTORY OF OTHER INFECTIOUS AND PARASITIC DISEASES: ICD-10-CM

## 2021-11-10 DIAGNOSIS — I35.0 NONRHEUMATIC AORTIC (VALVE) STENOSIS: ICD-10-CM

## 2021-11-10 DIAGNOSIS — I44.0 ATRIOVENTRICULAR BLOCK, FIRST DEGREE: ICD-10-CM

## 2021-11-10 DIAGNOSIS — Z85.038 PERSONAL HISTORY OF OTHER MALIGNANT NEOPLASM OF LARGE INTESTINE: ICD-10-CM

## 2021-11-10 DIAGNOSIS — N40.0 BENIGN PROSTATIC HYPERPLASIA WITHOUT LOWER URINARY TRACT SYMPTOMS: ICD-10-CM

## 2021-11-10 DIAGNOSIS — I24.8 OTHER FORMS OF ACUTE ISCHEMIC HEART DISEASE: ICD-10-CM

## 2021-12-03 NOTE — H&P ADULT - NSHPPHYSICALEXAM_GEN_ALL_CORE
PHYSICAL EXAM:      Constitutional: NAD on room air     Neck: JVD distention     Respiratory: bilateral crackles     Cardiovascular: RRR systolic murmur radiating to carotids     Gastrointestinal: soft non tender positive BS; surgical scar no organomegaly     Extremities: Bilateral pitting edema 2+    Neurological: Alert and oriented x 3 grossly intact no focal deficit, visual deficit See vital signs and flowsheets for detailed information.     Neuro: Pt alert and oriented to self only. Easily reoriented to time, place, and situation. Follows commands. ROSALES with severe weakness. Precedex weaned to 0.5 mcg/kg/hr.   Pulmonary: productive strong intermittent coughing noted. Copious amounts of thick creamy secretions expectorated after coughing. Lungs remain coarse throughout and diminished (B) bases.   Cardiac: SR/ST noted on monitor with max 's. Afebrile. No ectopy observed. Pt remains on Cardene gtt to maintain SBP < 160. Currently infusing at 10mg/hr.   GI: Tube feedings on hold, until am for advancement in NG to NJ tube. Medications admin. Without difficulty via NGT. Pt with one occurrence of incontinent stool.   : Flores catheter remains intact and patent with straw colored urine noted in bag.   Skin: perineum and scrotal area with redness. Small skin tear noted right upper arm.   IV: pt with PIV x2 unremarkable.     Plan: Continue to follow POC and notify MD of any changes or concerns that arise.

## 2022-04-27 PROBLEM — C18.9 MALIGNANT NEOPLASM OF COLON, UNSPECIFIED: Chronic | Status: ACTIVE | Noted: 2021-11-01

## 2022-04-27 PROBLEM — I10 ESSENTIAL (PRIMARY) HYPERTENSION: Chronic | Status: ACTIVE | Noted: 2021-11-01

## 2022-04-27 PROBLEM — E11.9 TYPE 2 DIABETES MELLITUS WITHOUT COMPLICATIONS: Chronic | Status: ACTIVE | Noted: 2021-11-01

## 2022-04-27 PROBLEM — E78.5 HYPERLIPIDEMIA, UNSPECIFIED: Chronic | Status: ACTIVE | Noted: 2021-11-01

## 2022-04-27 PROBLEM — B19.20 UNSPECIFIED VIRAL HEPATITIS C WITHOUT HEPATIC COMA: Chronic | Status: ACTIVE | Noted: 2021-11-01

## 2022-04-27 PROBLEM — I25.10 ATHEROSCLEROTIC HEART DISEASE OF NATIVE CORONARY ARTERY WITHOUT ANGINA PECTORIS: Chronic | Status: ACTIVE | Noted: 2021-11-01

## 2022-06-10 ENCOUNTER — TRANSCRIPTION ENCOUNTER (OUTPATIENT)
Age: 72
End: 2022-06-10

## 2022-06-10 ENCOUNTER — RESULT REVIEW (OUTPATIENT)
Age: 72
End: 2022-06-10

## 2022-06-10 ENCOUNTER — OUTPATIENT (OUTPATIENT)
Dept: OUTPATIENT SERVICES | Facility: HOSPITAL | Age: 72
LOS: 1 days | Discharge: HOME | End: 2022-06-10
Payer: MEDICARE

## 2022-06-10 VITALS
SYSTOLIC BLOOD PRESSURE: 160 MMHG | HEART RATE: 54 BPM | OXYGEN SATURATION: 99 % | RESPIRATION RATE: 16 BRPM | DIASTOLIC BLOOD PRESSURE: 74 MMHG

## 2022-06-10 VITALS — WEIGHT: 189.6 LBS | HEIGHT: 66 IN

## 2022-06-10 DIAGNOSIS — Z90.49 ACQUIRED ABSENCE OF OTHER SPECIFIED PARTS OF DIGESTIVE TRACT: Chronic | ICD-10-CM

## 2022-06-10 DIAGNOSIS — Z95.1 PRESENCE OF AORTOCORONARY BYPASS GRAFT: Chronic | ICD-10-CM

## 2022-06-10 PROCEDURE — 88305 TISSUE EXAM BY PATHOLOGIST: CPT | Mod: 26

## 2022-06-10 PROCEDURE — 88312 SPECIAL STAINS GROUP 1: CPT | Mod: 26

## 2022-06-10 RX ORDER — ASPIRIN/CALCIUM CARB/MAGNESIUM 324 MG
0 TABLET ORAL
Qty: 0 | Refills: 0 | DISCHARGE

## 2022-06-10 RX ORDER — PREGABALIN 225 MG/1
0 CAPSULE ORAL
Qty: 0 | Refills: 0 | DISCHARGE

## 2022-06-10 RX ORDER — CHOLECALCIFEROL (VITAMIN D3) 125 MCG
1 CAPSULE ORAL
Qty: 0 | Refills: 0 | DISCHARGE

## 2022-06-10 RX ORDER — ROSUVASTATIN CALCIUM 5 MG/1
1 TABLET ORAL
Qty: 0 | Refills: 0 | DISCHARGE

## 2022-06-10 RX ORDER — TAMSULOSIN HYDROCHLORIDE 0.4 MG/1
1 CAPSULE ORAL
Qty: 0 | Refills: 0 | DISCHARGE

## 2022-06-10 RX ORDER — ESCITALOPRAM OXALATE 10 MG/1
1 TABLET, FILM COATED ORAL
Qty: 0 | Refills: 0 | DISCHARGE

## 2022-06-10 RX ORDER — METOPROLOL TARTRATE 50 MG
1 TABLET ORAL
Qty: 0 | Refills: 0 | DISCHARGE

## 2022-06-10 RX ORDER — PANTOPRAZOLE SODIUM 20 MG/1
1 TABLET, DELAYED RELEASE ORAL
Qty: 0 | Refills: 0 | DISCHARGE

## 2022-06-10 NOTE — ASU DISCHARGE PLAN (ADULT/PEDIATRIC) - NS MD DC FALL RISK RISK
For information on Fall & Injury Prevention, visit: [email or web address] OR  [email or web address] OR  https://www.cdc.gov/steadi/patient.html

## 2022-06-10 NOTE — ASU PREOP CHECKLIST - AS TEMP SITE
70 year old male with PMx of HTN, depression, chronic back pain, transferred from [facility], where he was admitted on 11/2 after an unwitnessed fall (down for >1 day) and found to have acute hypoxic respiratory failure secondary to COVID-19 pneumonia.      Pt seen in room, sitting up in bed on HFNC. Pt reports not eating for 4 days PTA. Pt started on DASH diet, now downgraded to minced and moist for ease of chewing. Pt eating 0% breakfast this AM, lunch tray arriving during assessment. RD set up lunch tray for pt and encouraged PO intake. Pt reports UBW ~200lbs, consistent with admit wt. No N/V/D/C reported, last BM 11/6. Skin with stage 2 pressure injury to buttocks. Please see full recs below. Will continue to follow per RD protocol. oral

## 2022-06-10 NOTE — CHART NOTE - NSCHARTNOTEFT_GEN_A_CORE
PACU ANESTHESIA ADMISSION NOTE      Procedure: EGD and colonoscopy  Post op diagnosis:      ____  Intubated  TV:______       Rate: ______      FiO2: ______    _x___  Patent Airway    _x___  Full return of protective reflexes    _x___  Full recovery from anesthesia / back to baseline status    Vitals  SPO2:-100  HR:-49  RR:-12  B.P:-122/60  RR:-    Mental Status:  _x___ Awake   ___x_ Alert   _____ Drowsy   _____ Sedated    Nausea/Vomiting:  _x___  NO       ______Yes,   See Post - Op Orders         Pain Scale (0-10):  __0___    Treatment: _x___ None    __x__ See Post - Op/PCA Orders    Post - Operative Fluids:   ___ Oral   ____x See Post - Op Orders    Plan: Discharge:   __x__Home       ___Floor     _____Critical Care    _____  Other:_________________    Comments:  Report endorsed to RN in pacu  Vitals stable  No anesthesia issues or complications noted.  Discharge to patient to  home when criteria met.

## 2022-06-10 NOTE — ASU PATIENT PROFILE, ADULT - TEACHING/LEARNING DEVELOPMENTAL CONSIDERATIONS
Discharge to home  1. Report any new symptoms  2.Cut furosemide in half to 20 mg a day  3. Follow daily weights and fluid status and report any change  4. Check blood test for kidney function and potassium in 2 weeks   none

## 2022-06-13 LAB
SURGICAL PATHOLOGY STUDY: SIGNIFICANT CHANGE UP
SURGICAL PATHOLOGY STUDY: SIGNIFICANT CHANGE UP

## 2022-06-15 DIAGNOSIS — Z90.49 ACQUIRED ABSENCE OF OTHER SPECIFIED PARTS OF DIGESTIVE TRACT: ICD-10-CM

## 2022-06-15 DIAGNOSIS — Z85.038 PERSONAL HISTORY OF OTHER MALIGNANT NEOPLASM OF LARGE INTESTINE: ICD-10-CM

## 2022-06-15 DIAGNOSIS — Z12.11 ENCOUNTER FOR SCREENING FOR MALIGNANT NEOPLASM OF COLON: ICD-10-CM

## 2022-06-15 DIAGNOSIS — E11.9 TYPE 2 DIABETES MELLITUS WITHOUT COMPLICATIONS: ICD-10-CM

## 2022-06-15 DIAGNOSIS — K29.80 DUODENITIS WITHOUT BLEEDING: ICD-10-CM

## 2022-06-15 DIAGNOSIS — E78.00 PURE HYPERCHOLESTEROLEMIA, UNSPECIFIED: ICD-10-CM

## 2022-06-15 DIAGNOSIS — I10 ESSENTIAL (PRIMARY) HYPERTENSION: ICD-10-CM

## 2022-09-09 NOTE — DISCHARGE NOTE PROVIDER - CARE PROVIDER_API CALL
Peter Grewal [facility]  [address] [place] [address]  Phone: [phone]  Fax: [phone]  Scheduled Appointment: 11/09/2021 06:00 PM    Abdi Ortiz)  Surgery; Thoracic and Cardiac Surgery  [address] [place] [address]  Phone: [phone]  Fax: [phone]  Follow Up Time: 1 week    João Chaney)  Cardiovascular Disease; Internal Medicine  [address] [place] [address]  Phone: [phone]  Fax: [phone]  Follow Up Time: 2 weeks   No

## 2022-10-12 NOTE — H&P ADULT - NSHPSOURCEINFORD_GEN_ALL_CORE
This is a recent snapshot of the patient's [facility] medical record.  For current drug dose and complete information and questions, call [phone] or In Basket pool, fv home infusion ([id number])  CSN Number:  [id number]    
Chart(s)/Patient

## 2023-04-17 NOTE — PATIENT PROFILE ADULT - INTERNATIONAL TRAVEL
No
What Type Of Note Output Would You Prefer (Optional)?: Standard Output
Hpi Title: Evaluation of Skin Lesions
How Severe Are Your Spot(S)?: mild
Have Your Spot(S) Been Treated In The Past?: has not been treated
Additional History: The pt states he is concerned for a new growth on the left temple as well as some brown spots beneath the left eyebrow and a red spot on the back of his neck.

## 2024-05-01 NOTE — H&P ADULT - NSHPPOAURINARYCATHETER_GEN_ALL_CORE
Patient: Gregory Parnell    Procedure: Procedure(s):  REPAIR, PTOSIS, BILATERAL       Anesthesia Type:  MAC    Note:  Disposition: Outpatient   Postop Pain Control: Uneventful            Sign Out: Well controlled pain   PONV: No   Neuro/Psych: Uneventful            Sign Out: Acceptable/Baseline neuro status   Airway/Respiratory: Uneventful            Sign Out: Acceptable/Baseline resp. status   CV/Hemodynamics: Uneventful            Sign Out: Acceptable CV status; No obvious hypovolemia; No obvious fluid overload   Other NRE:    DID A NON-ROUTINE EVENT OCCUR?            Last vitals:  Vitals Value Taken Time   /50 05/01/24 1230   Temp 36.9  C (98.5  F) 05/01/24 1230   Pulse     Resp 14 05/01/24 1230   SpO2 98 % 05/01/24 1230       Electronically Signed By: Uziel Cui MD  May 1, 2024  12:36 PM  
no
